# Patient Record
Sex: FEMALE | Race: WHITE | NOT HISPANIC OR LATINO | Employment: FULL TIME | URBAN - METROPOLITAN AREA
[De-identification: names, ages, dates, MRNs, and addresses within clinical notes are randomized per-mention and may not be internally consistent; named-entity substitution may affect disease eponyms.]

---

## 2018-11-16 ENCOUNTER — HOSPITAL ENCOUNTER (EMERGENCY)
Facility: HOSPITAL | Age: 33
Discharge: HOME | End: 2018-11-16
Attending: EMERGENCY MEDICINE | Admitting: EMERGENCY MEDICINE
Payer: OTHER MISCELLANEOUS

## 2018-11-16 VITALS
RESPIRATION RATE: 16 BRPM | SYSTOLIC BLOOD PRESSURE: 140 MMHG | HEART RATE: 74 BPM | DIASTOLIC BLOOD PRESSURE: 77 MMHG | TEMPERATURE: 96 F | WEIGHT: 170 LBS | OXYGEN SATURATION: 99 %

## 2018-11-16 DIAGNOSIS — IMO0001 NEEDLESTICK INJURY OF FINGER, INITIAL ENCOUNTER: Primary | ICD-10-CM

## 2018-11-16 LAB — ALT SERPL W P-5'-P-CCNC: 36 U/L (ref 9–52)

## 2018-11-16 PROCEDURE — 86706 HEP B SURFACE ANTIBODY: CPT | Performed by: EMERGENCY MEDICINE

## 2018-11-16 PROCEDURE — 99283 EMERGENCY DEPT VISIT LOW MDM: CPT

## 2018-11-16 PROCEDURE — 86803 HEPATITIS C AB TEST: CPT | Performed by: EMERGENCY MEDICINE

## 2018-11-16 PROCEDURE — 36415 COLL VENOUS BLD VENIPUNCTURE: CPT | Performed by: EMERGENCY MEDICINE

## 2018-11-16 PROCEDURE — 84460 ALANINE AMINO (ALT) (SGPT): CPT | Performed by: EMERGENCY MEDICINE

## 2018-11-16 PROCEDURE — 87389 HIV-1 AG W/HIV-1&-2 AB AG IA: CPT | Performed by: EMERGENCY MEDICINE

## 2018-11-16 PROCEDURE — 87340 HEPATITIS B SURFACE AG IA: CPT | Performed by: EMERGENCY MEDICINE

## 2018-11-16 NOTE — ED NOTES
Certificate of significance, pt demographics, and work related employee injury report paper work all faxed to [facility] ([phone]) as per protocol/policy     Doris Dumont RN  11/16/18 8863

## 2018-11-16 NOTE — DISCHARGE INSTRUCTIONS
Puncture Wound   WHAT YOU NEED TO KNOW:   A puncture wound is a hole in the skin made by a sharp, pointed object  DISCHARGE INSTRUCTIONS:   Return to the emergency department if:   · You have severe pain  · You have numbness or tingling in the area of your wound  · Your wound starts bleeding and does not stop, even after you apply pressure  Contact your healthcare provider if:   · You have new drainage or a bad odor coming from the wound  · You have a fever  · You have increased swelling, redness, or pain  · You have red streaks on your skin coming from your wound  · You have questions or concerns about your condition or care  Medicines: You may need any of the following:  · NSAIDs , such as ibuprofen, help decrease swelling, pain, and fever  This medicine is available with or without a doctor's order  NSAIDs can cause stomach bleeding or kidney problems in certain people  If you take blood thinner medicine, always ask your healthcare provider if NSAIDs are safe for you  Always read the medicine label and follow directions  · Antibiotics  help treat a bacterial infection  · Take your medicine as directed  Contact your healthcare provider if you think your medicine is not helping or if you have side effects  Tell him of her if you are allergic to any medicine  Keep a list of the medicines, vitamins, and herbs you take  Include the amounts, and when and why you take them  Bring the list or the pill bottles to follow-up visits  Carry your medicine list with you in case of an emergency  Wound care:  Keep your wound clean and dry  When you are allowed to bathe, carefully wash the wound with soap and water  Dry the area and put on new, clean bandages as directed  Change your bandages when they get wet or dirty  Manage your symptoms:   · Rest  your injured area as much as possible  If the puncture wound is in your leg or foot, use crutches as directed   This will help keep the weight off your injured leg or foot as it heals  · Elevate  your injured area above the level of your heart as often as you can  This will help decrease swelling and pain  Prop your injured area on pillows or blankets to keep it elevated comfortably  Follow up with your healthcare provider in 2 to 3 days:  Write down your questions so you remember to ask them during your visits  © 2017 [address] [employer] Information is for End User's use only and may not be sold, redistributed or otherwise used for commercial purposes  All illustrations and images included in CareNotes® are the copyrighted property of [employer]  or El[employer]  The above information is an  only  It is not intended as medical advice for individual conditions or treatments  Talk to your doctor, nurse or pharmacist before following any medical regimen to see if it is safe and effective for you

## 2018-11-16 NOTE — ED NOTES
Pt states that source pt had "exposure panel" blood work ordered from source pts attending dr Jaquelin Sarkar, RN  11/16/18 9314

## 2018-11-16 NOTE — ED PROVIDER NOTES
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 had needle stick to her right 1st finger  puncture site washed/cleaned [facility]     Needlestick exposure after drawing blood on patient  No visible blood on needle  Stuck through glove  Washed immediately  Source patient unknown status  Tetanus last updated 4/18 and patient received full series of HepB vaccine and HebB surface antibody positive  No other associated factors  Source patient MRN [id number]            None       History reviewed  No pertinent past medical history  History reviewed  No pertinent surgical history  History reviewed  No pertinent family history  I have reviewed and agree with the history as documented  Social History   Substance Use Topics    Smoking status: Current Some Day Smoker    Smokeless tobacco: Never Used    Alcohol use Yes      Comment: socially        Review of Systems   Constitutional: Negative for chills and fever  Musculoskeletal: Negative for arthralgias and joint swelling  Skin: Positive for wound  Negative for color change  Physical Exam  Physical Exam   Constitutional: She appears well-developed and well-nourished  HENT:   Head: Normocephalic and atraumatic  Cardiovascular: Normal rate and regular rhythm  Pulmonary/Chest: Effort normal and breath sounds normal    Skin:   Puncture wound base of right index finger  No redness or swelling         Vital Signs  ED Triage Vitals [11/16/18 1315]   Temperature Pulse Respirations Blood Pressure SpO2   (!) 96 °F (35 6 °C) 74 16 140/77 99 %      Temp Source Heart Rate Source Patient Position - Orthostatic VS BP Location FiO2 (%)   Tympanic -- Sitting Left arm --      Pain Score       --           Vitals:    11/16/18 1315   BP: 140/77   Pulse: 74   Patient Position - Orthostatic VS: Sitting       Visual Acuity      ED Medications  Medications - No data to display    Diagnostic Studies  Results Reviewed     Procedure Component Value Units Date/Time    HIV 1/2 AG-AB combo [[id number]]  (Normal) Collected:  11/16/18 1347    Lab Status:  Final result Specimen:  Blood from Arm, Left Updated:  11/18/18 0929     HIV-1/HIV-2 Ab Non-Reactive    Narrative: This test detects HIV 1, HIV2 and p24 Antigen  Hepatitis B surface antigen [[id number]]  (Normal) Collected:  11/16/18 1347    Lab Status:  Final result Specimen:  Blood from Arm, Left Updated:  11/17/18 1029     Hepatitis B Surface Ag Non-reactive    Hepatitis C antibody [[id number]]  (Normal) Collected:  11/16/18 1347    Lab Status:  Final result Specimen:  Blood from Arm, Left Updated:  11/17/18 1029     Hepatitis C Ab Non-reactive    Hepatitis B surface antibody [[id number]] Collected:  11/16/18 1347    Lab Status:  Final result Specimen:  Blood from Arm, Left Updated:  11/17/18 1029     Hep B S Ab >1,000 00 mIU/mL     ALT [[id number]]  (Normal) Collected:  11/16/18 1347    Lab Status:  Final result Specimen:  Blood from Arm, Left Updated:  11/16/18 1403     ALT 36 U/L                  No orders to display              Procedures  Procedures       Phone Contacts  ED Phone Contact    ED Course  ED Course as of Nov 20 0728 Fri Nov 16, 2018   1335 Confirmed with Dr Reynaldo Resendiz that source patient exposure panel    [phone] with lab at [facility]  Source patient blood tube is at [employer]  I called [facility] lab to confirm they have source patient bloodwork for add on and they do not  Called psychiatry floor to advise patient needs redraw for source patient labs  1430 I spoke with patient regarding PEP  PEP was offered as patient's status is unknown  She would like to defer until patient's HIV status is confirmed                                  MDM  CritCare Time    Disposition  Final diagnoses:   Needlestick injury of finger, initial encounter     Time reflects when diagnosis was documented in both MDM as applicable and the Disposition within this note     Time User Action Codes Description Comment    11/16/2018  2:44 PM January Ely Add [M90 483F,  W27  3XXA] Needlestick injury of finger, initial encounter       ED Disposition     ED Disposition Condition Comment    Discharge  Bari Landau discharge to home/self care  Condition at discharge: Stable        Follow-up Information     Follow up With Specialties Details Why [address] [facility]  In 3 days  [address] [place] [address]  [phone]          There are no discharge medications for this patient  No discharge procedures on file      ED Provider  Electronically Signed by           Allie Castellanos MD  11/20/18 7131

## 2018-11-17 LAB
HBV SURFACE AB SER-ACNC: >1000 MIU/ML
HBV SURFACE AG SER QL: NORMAL
HCV AB SER QL: NORMAL

## 2018-11-18 LAB — HIV 1+2 AB+HIV1 P24 AG SERPL QL IA: NORMAL

## 2021-09-17 ENCOUNTER — APPOINTMENT (OUTPATIENT)
Dept: URGENT CARE | Age: 36
End: 2021-09-17

## 2021-09-17 ENCOUNTER — APPOINTMENT (OUTPATIENT)
Dept: LAB | Age: 36
End: 2021-09-17

## 2021-09-17 DIAGNOSIS — Z00.8 OTHER SPECIFIED GENERAL MEDICAL EXAMINATION: ICD-10-CM

## 2021-09-17 LAB — RUBV IGG SERPL IA-ACNC: >175 IU/ML

## 2021-09-17 PROCEDURE — 86787 VARICELLA-ZOSTER ANTIBODY: CPT

## 2021-09-17 PROCEDURE — 86480 TB TEST CELL IMMUN MEASURE: CPT

## 2021-09-17 PROCEDURE — 86735 MUMPS ANTIBODY: CPT

## 2021-09-17 PROCEDURE — 36415 COLL VENOUS BLD VENIPUNCTURE: CPT

## 2021-09-17 PROCEDURE — 86765 RUBEOLA ANTIBODY: CPT

## 2021-09-17 PROCEDURE — 86762 RUBELLA ANTIBODY: CPT

## 2021-09-20 LAB
GAMMA INTERFERON BACKGROUND BLD IA-ACNC: 0.04 IU/ML
M TB IFN-G BLD-IMP: NEGATIVE
M TB IFN-G CD4+ BCKGRND COR BLD-ACNC: 0 IU/ML
M TB IFN-G CD4+ BCKGRND COR BLD-ACNC: 0 IU/ML
MEV IGG SER QL: NORMAL
MITOGEN IGNF BCKGRD COR BLD-ACNC: 8.18 IU/ML
MUV IGG SER QL: NORMAL

## 2021-09-21 LAB — VZV IGG SER IA-ACNC: NORMAL

## 2022-01-18 ENCOUNTER — HOSPITAL ENCOUNTER (OUTPATIENT)
Dept: MRI IMAGING | Facility: HOSPITAL | Age: 37
Discharge: HOME | End: 2022-01-18
Payer: COMMERCIAL

## 2022-01-18 DIAGNOSIS — M25.562 PAIN IN LEFT KNEE: ICD-10-CM

## 2022-01-18 PROCEDURE — G1004 CDSM NDSC: HCPCS

## 2022-01-18 PROCEDURE — 73721 MRI JNT OF LWR EXTRE W/O DYE: CPT

## 2022-01-31 ENCOUNTER — EVALUATION (OUTPATIENT)
Dept: PHYSICAL THERAPY | Facility: REHABILITATION | Age: 37
End: 2022-01-31
Payer: COMMERCIAL

## 2022-01-31 DIAGNOSIS — M25.562 ACUTE PAIN OF LEFT KNEE: Primary | ICD-10-CM

## 2022-01-31 DIAGNOSIS — S82.102D CLOSED FRACTURE OF PROXIMAL END OF LEFT TIBIA WITH ROUTINE HEALING, UNSPECIFIED FRACTURE MORPHOLOGY, SUBSEQUENT ENCOUNTER: ICD-10-CM

## 2022-01-31 PROCEDURE — 97110 THERAPEUTIC EXERCISES: CPT | Performed by: PHYSICAL THERAPIST

## 2022-01-31 PROCEDURE — 97161 PT EVAL LOW COMPLEX 20 MIN: CPT | Performed by: PHYSICAL THERAPIST

## 2022-01-31 NOTE — PROGRESS NOTES
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  She would benefit from physical therapy to improve strength and mobility and progress her to weight bearing activities when appropriate  Impairments: abnormal gait, abnormal or restricted ROM, activity intolerance, lacks appropriate home exercise program and pain with function    Symptom irritability: moderateUnderstanding of Dx/Px/POC: excellent  Goals  STG: in 4 weeks  Independent with HEP  Full ROM  Begin WB activities  LTG: by discharge  Pt RTW as a nurse  Returns to her prior exercise routine at the gym  Able to participate in activity with her son    Plan  Patient would benefit from: skilled physical therapy  Referral necessary: No  Planned modality interventions: cryotherapy  Planned therapy interventions: manual therapy, neuromuscular re-education, patient education, strengthening, stretching, therapeutic exercise and home exercise program  Frequency: 2x week  Duration in weeks: 8  Treatment plan discussed with: patient        Subjective Evaluation    History of Present Illness  Date of onset: 1/15/2022  Mechanism of injury: Hi a big chunk of ice while snow tubing and flipped out of the tube and sustained a small tibial fracture     Pt was advised to be NWB until her NDV on 2/10/22  Pain  Current pain ratin  At best pain ratin  At worst pain ratin  Location: lateral knee    Life stress: nurse, 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 independent but recently she has had to do more physical patient care  Patient Goals  Patient goals for therapy: decreased pain  Patient goal: exercise, work        Objective     Tenderness     Additional Tenderness Details  Tibial plateua    Active Range of Motion   Left Knee   Flexion: 98 degrees with pain  Extensor lag: 3 degrees with pain    Right Knee   Normal active range of motion    Passive Range of Motion   Left Knee   Flexion: 100 degrees   Extension: 0 degrees     Right Knee   Flexion: 150 degrees   Extension: 0 degrees     Strength/Myotome Testing     Left Knee   Quadriceps contraction: good    Right Knee   Normal strength    Swelling     Left Knee Girth Measurement (cm)   Joint line: 38 cm  10 cm below joint line: 44 cm    Right Knee Girth Measurement (cm)   Joint line: 38 cm  10 cm below joint line: 46 cm             Precautions: NWB     Daily Treatment Diary      Assessment  1/31                     Eval/Reval                       FOTO         **         **   Manuals                                                     Exercise Diary     QS 5"x10                      SLR w/QS 2x5                      Heel slides x10                      SL hip abd x10                      prone hip ext x10                      prone ham curls x10                                                                                                                                                                                                                                                                     Modalities                                                 Access Code: [email or web address]  URL: [email or web address]  Date: 01/31/2022  Prepared by: [place]    Exercises  Long Sitting Quad Set - 1 x daily - 7 x weekly - 3 sets - 10 reps - 5 sec hold  Active Straight Leg Raise with Quad Set - 1 x daily - 7 x weekly - 3 sets - 10 reps  Sidelying Hip Abduction - 1 x daily - 7 x weekly - 3 sets - 10 reps  Prone Hip Extension - 1 x daily - 7 x weekly - 3 sets - 10 reps  Prone Knee Flexion AROM - 1 x daily - 7 x weekly - 3 sets - 10 reps  Supine Knee Extension Strengthening - 1 x daily - 7 x weekly - 3 sets - 10 reps  Supine Heel Slide with Strap - 1 x daily - 7 x weekly - 3 sets - 10 reps - 5 sec hold

## 2022-02-02 ENCOUNTER — OFFICE VISIT (OUTPATIENT)
Dept: PHYSICAL THERAPY | Facility: REHABILITATION | Age: 37
End: 2022-02-02
Payer: COMMERCIAL

## 2022-02-02 DIAGNOSIS — S82.102D CLOSED FRACTURE OF PROXIMAL END OF LEFT TIBIA WITH ROUTINE HEALING, UNSPECIFIED FRACTURE MORPHOLOGY, SUBSEQUENT ENCOUNTER: ICD-10-CM

## 2022-02-02 DIAGNOSIS — M25.562 ACUTE PAIN OF LEFT KNEE: Primary | ICD-10-CM

## 2022-02-02 PROCEDURE — 97110 THERAPEUTIC EXERCISES: CPT | Performed by: PHYSICAL THERAPIST

## 2022-02-02 NOTE — PROGRESS NOTES
Daily Note     Today's date: 2022  Patient name: Arpita Mccollum  : 1985  MRN: [id number]  Referring provider: Merline Knack, DO  Dx:   Encounter Diagnosis     ICD-10-CM    1  Acute pain of left knee  M25 562    2  Closed fracture of proximal end of left tibia with routine healing, unspecified fracture morphology, subsequent encounter  S82 102D                   Subjective: Pt reports that she is a little more sore at the end of the day since beginning exercises  Objective: See treatment diary below  Adjusted pt's brace to fit her thigh better  Assessment: Tolerated treatment well  Knee flexion ROM is improving  Patient demonstrated fatigue post treatment      Plan: Continue per plan of care        Precautions: NWB     Daily Treatment Diary      Assessment    22                   Eval/Reval                       FOTO         **         **   Manuals    PROM   NT                   Prone quad stretch                          Exercise Diary     QS 5"x10  5"x15                    SLR w/QS 2x5  x10                    Heel slides x10  5"x10                    SL hip abd x10  2x10                    prone hip ext x10  2x10                    prone ham curls x10  x10                    SAQ   2x10                    prone knee flex stretch   20"x3                    hip adduction   10"x10                                                                                                                                                                                           Modalities

## 2022-02-07 ENCOUNTER — APPOINTMENT (OUTPATIENT)
Dept: PHYSICAL THERAPY | Facility: REHABILITATION | Age: 37
End: 2022-02-07
Payer: COMMERCIAL

## 2022-02-09 ENCOUNTER — OFFICE VISIT (OUTPATIENT)
Dept: PHYSICAL THERAPY | Facility: REHABILITATION | Age: 37
End: 2022-02-09
Payer: COMMERCIAL

## 2022-02-09 DIAGNOSIS — M25.562 ACUTE PAIN OF LEFT KNEE: Primary | ICD-10-CM

## 2022-02-09 DIAGNOSIS — S82.102D CLOSED FRACTURE OF PROXIMAL END OF LEFT TIBIA WITH ROUTINE HEALING, UNSPECIFIED FRACTURE MORPHOLOGY, SUBSEQUENT ENCOUNTER: ICD-10-CM

## 2022-02-09 PROCEDURE — 97140 MANUAL THERAPY 1/> REGIONS: CPT

## 2022-02-09 PROCEDURE — 97110 THERAPEUTIC EXERCISES: CPT

## 2022-02-09 NOTE — PROGRESS NOTES
Daily Note     Today's date: 2022  Patient name: Lacie Howard  : 1985  MRN: [id number]  Referring provider: Octavia Dejesus DO  Dx:   Encounter Diagnosis     ICD-10-CM    1  Acute pain of left knee  M25 562    2  Closed fracture of proximal end of left tibia with routine healing, unspecified fracture morphology, subsequent encounter  S82 336D                   Subjective:  Patient reports that her knee aches at night especially when it's cold  Deonte Gray notes that otherwise she is doing well  Patient reports compliance with brace use and maintaining her NWB status  Objective: See treatment diary below      Assessment: Patient tolerated treatment well  Patient performed ex and received manual therapy as noted  Patient demonstrates a good L quad activation and maintains good knee control during SLR  L knee AROM 0-135 degrees  Patient would benefit from continued PT intervention to address deficits and attain set goals  Plan: Continue per plan of care        Precautions: NWB     Daily Treatment Diary      Assessment                   Eval/Reval                       FOTO         **         **   Manuals    PROM   NT  CC                 Prone quad stretch                          Exercise Diary     QS 5"x10  5"x15  5"x20                  SLR w/QS 2x5  x10  x15                  Heel slides x10  5"x10  10"x10                  SL hip abd x10  2x10  2x10                  prone hip ext x10  2x10  2x10                  prone ham curls x10  x10  2x10                  SAQ   2x10  5" 2x10                  prone knee flex stretch   20"x3  20"x3                  hip adduction   10"x10  10"x10                                                                                                                                                                                         Modalities

## 2022-02-14 ENCOUNTER — OFFICE VISIT (OUTPATIENT)
Dept: PHYSICAL THERAPY | Facility: REHABILITATION | Age: 37
End: 2022-02-14
Payer: COMMERCIAL

## 2022-02-14 DIAGNOSIS — S82.102D CLOSED FRACTURE OF PROXIMAL END OF LEFT TIBIA WITH ROUTINE HEALING, UNSPECIFIED FRACTURE MORPHOLOGY, SUBSEQUENT ENCOUNTER: ICD-10-CM

## 2022-02-14 DIAGNOSIS — M25.562 ACUTE PAIN OF LEFT KNEE: Primary | ICD-10-CM

## 2022-02-14 PROCEDURE — 97112 NEUROMUSCULAR REEDUCATION: CPT

## 2022-02-14 PROCEDURE — 97110 THERAPEUTIC EXERCISES: CPT

## 2022-02-14 NOTE — PROGRESS NOTES
Daily Note     Today's date: 2022  Patient name: Qian Birmingham  : 1985  MRN: [id number]  Referring provider: Katalina Ribeiro DO  Dx:   Encounter Diagnosis     ICD-10-CM    1  Acute pain of left knee  M25 562    2  Closed fracture of proximal end of left tibia with routine healing, unspecified fracture morphology, subsequent encounter  S82 102D                   Subjective:  Patient reports that she saw the Dr and she was able to get rid of the crutches and open her brace  Sally Braydon notes feeling some muscle soreness with transitioning off the crutches  She may return to work in 2 weeks  Objective: See treatment diary below      Assessment: Patient tolerated treatment well  Patient performed ex as noted  Added stationary biking and progressed ex as noted  Patient performed ex with appropriate challenge and no reports of pain  Good L knee ROM noted  Patient is ambulating with the ROM brace unlocked (no crutches) with good gait mechanics noted  Patient would benefit from continued PT intervention to address deficits and attain set goals  Plan: Continue per plan of care        Precautions: NWB     Daily Treatment Diary      Assessment                 Eval/Reval                       FOTO         **         **   Manuals    PROM   NT  CC  np               Prone quad stretch                          Exercise Diary     QS 5"x10  5"x15  5"x20  5"x20                SLR w/QS 2x5  x10  x15  5" 2x10                Heel slides x10  5"x10  10"x10  HEP                SL hip abd x10  2x10  2x10  5" 2x10                prone hip ext x10  2x10  2x10  5" 2x10                prone ham curls x10  x10  2x10  standing  5" 2x10                SAQ   2x10  5" 2x10  5" 2x10                prone knee flex stretch   20"x3  20"x3  HEP                hip adduction   10"x10  10"x10  s/l 2x10 bike        x6'                                                               Modalities

## 2022-02-16 ENCOUNTER — OFFICE VISIT (OUTPATIENT)
Dept: PHYSICAL THERAPY | Facility: REHABILITATION | Age: 37
End: 2022-02-16
Payer: COMMERCIAL

## 2022-02-16 DIAGNOSIS — M25.562 ACUTE PAIN OF LEFT KNEE: Primary | ICD-10-CM

## 2022-02-16 DIAGNOSIS — S82.102D CLOSED FRACTURE OF PROXIMAL END OF LEFT TIBIA WITH ROUTINE HEALING, UNSPECIFIED FRACTURE MORPHOLOGY, SUBSEQUENT ENCOUNTER: ICD-10-CM

## 2022-02-16 PROCEDURE — 97112 NEUROMUSCULAR REEDUCATION: CPT | Performed by: PHYSICAL THERAPIST

## 2022-02-16 PROCEDURE — 97140 MANUAL THERAPY 1/> REGIONS: CPT | Performed by: PHYSICAL THERAPIST

## 2022-02-16 PROCEDURE — 97110 THERAPEUTIC EXERCISES: CPT | Performed by: PHYSICAL THERAPIST

## 2022-02-16 NOTE — PROGRESS NOTES
Daily Note     Today's date: 2022  Patient name: Yessica Gonsalves  : 1985  MRN: [id number]  Referring provider: Antonieta Xiao DO  Dx:   Encounter Diagnosis     ICD-10-CM    1  Acute pain of left knee  M25 562    2  Closed fracture of proximal end of left tibia with routine healing, unspecified fracture morphology, subsequent encounter  S82 102D                   Subjective:  Patient reports mild soreness from being on her feet but overall feeling good  Objective: See treatment diary below      Assessment: Patient tolerated treatment well  Pt demonstrates good quad control  Tolerated increases well  Can't yet sit with her leg curled underneath her  Plan: Continue per plan of care      RTW   Precautions: NWB     Daily Treatment Diary      Assessment               Eval/Reval                       FOTO         x         **   Manuals    PROM   NT  CC  np  prone knee flex stretch             Prone quad stretch                          Exercise Diary     QS 5"x10  5"x15  5"x20  5"x20  5"x20              SLR w/QS 2x5  x10  x15  5" 2x10  5"x20              Heel slides x10  5"x10  10"x10  HEP                SL hip abd x10  2x10  2x10  5" 2x10 1#2x10              prone hip ext x10  2x10  2x10  5" 2x10 1#2x10              prone ham curls x10  x10  2x10  standing  5" 2x10 1# 2x10              SAQ   2x10  5" 2x10  5" 2x10 1#2x10              prone knee flex stretch   20"x3  20"x3  HEP                hip adduction   10"x10  10"x10  s/l 2x10 1#2x10              bridges                                                                                                bike        x6'  10'              HR/TR         2x10ea              Rocker board                       Modalities

## 2022-02-21 ENCOUNTER — OFFICE VISIT (OUTPATIENT)
Dept: PHYSICAL THERAPY | Facility: REHABILITATION | Age: 37
End: 2022-02-21
Payer: COMMERCIAL

## 2022-02-21 DIAGNOSIS — M25.562 ACUTE PAIN OF LEFT KNEE: Primary | ICD-10-CM

## 2022-02-21 DIAGNOSIS — S82.102D CLOSED FRACTURE OF PROXIMAL END OF LEFT TIBIA WITH ROUTINE HEALING, UNSPECIFIED FRACTURE MORPHOLOGY, SUBSEQUENT ENCOUNTER: ICD-10-CM

## 2022-02-21 PROCEDURE — 97110 THERAPEUTIC EXERCISES: CPT

## 2022-02-21 PROCEDURE — 97112 NEUROMUSCULAR REEDUCATION: CPT

## 2022-02-21 NOTE — PROGRESS NOTES
Daily Note     Today's date: 2022  Patient name: Gene Ramos  : 1985  MRN: [id number]  Referring provider: Marva Bruce DO  Dx:   Encounter Diagnosis     ICD-10-CM    1  Acute pain of left knee  M25 562    2  Closed fracture of proximal end of left tibia with routine healing, unspecified fracture morphology, subsequent encounter  S82 223D                   Subjective:   Patient reports that her leg gets achey with weather changes and at the end of the day  She notes her leg feels weak at the end of the day also  Objective: See treatment diary below      Assessment: Patient tolerated treatment well  Patient performed ex and received manual therapy as noted  Patient denied any soreness or pain during the session  L knee ROM and strength continue to improve  Patient would benefit from continued PT intervention to address deficits and attain set goals  Plan: Continue per plan of care        RTW   Precautions: NWB     Daily Treatment Diary      Assessment             Eval/Reval                       FOTO         x         **   Manuals    PROM   NT  CC  np  prone knee flex stretch  CC           Prone quad stretch                          Exercise Diary     QS 5"x10  5"x15  5"x20  5"x20  5"x20  5"x20            SLR w/QS 2x5  x10  x15  5" 2x10  5"x20  1# 2x10            Heel slides x10  5"x10  10"x10  HEP                SL hip abd x10  2x10  2x10  5" 2x10 1#2x10  1# 2x10            prone hip ext x10  2x10  2x10  5" 2x10 1#2x10  1# 2x10            prone ham curls x10  x10  2x10  standing  5" 2x10 1# 2x10  1# 2x10            SAQ   2x10  5" 2x10  5" 2x10 1#2x10  1#   2x10            prone knee flex stretch   20"x3  20"x3  HEP                hip adduction   10"x10  10"x10  s/l 2x10 1#2x10  1#  2x10            bridges            5"x10                                                                                    bike        x6'  10'  10' HR/TR         2x10ea  2x10 ea            Rocker board                       Modalities

## 2022-02-23 ENCOUNTER — OFFICE VISIT (OUTPATIENT)
Dept: PHYSICAL THERAPY | Facility: REHABILITATION | Age: 37
End: 2022-02-23
Payer: COMMERCIAL

## 2022-02-23 DIAGNOSIS — S82.102D CLOSED FRACTURE OF PROXIMAL END OF LEFT TIBIA WITH ROUTINE HEALING, UNSPECIFIED FRACTURE MORPHOLOGY, SUBSEQUENT ENCOUNTER: ICD-10-CM

## 2022-02-23 DIAGNOSIS — M25.562 ACUTE PAIN OF LEFT KNEE: Primary | ICD-10-CM

## 2022-02-23 PROCEDURE — 97110 THERAPEUTIC EXERCISES: CPT

## 2022-02-23 PROCEDURE — 97112 NEUROMUSCULAR REEDUCATION: CPT

## 2022-02-23 NOTE — PROGRESS NOTES
Daily Note     Today's date: 2022  Patient name: Lily Mckenzie  : 1985  MRN: [id number]  Referring provider: Daniel Mcintyre DO  Dx:   Encounter Diagnosis     ICD-10-CM    1  Acute pain of left knee  M25 562    2  Closed fracture of proximal end of left tibia with routine healing, unspecified fracture morphology, subsequent encounter  S83 483D                   Subjective:  Patient reports that she was sore in her hamstrings after her last visit but it didn't last long  Objective: See treatment diary below      Assessment: Patient tolerated treatment well  Patient performed ex and received manual therapy as noted  Patient noted fatigue only post treatment  Patient would benefit from continued PT intervention to address deficits and attain set goals  Plan: Continue per plan of care        RTW   Precautions: NWB     Daily Treatment Diary      Assessment           Eval/Reval                       FOTO         x         **   Manuals    PROM   NT  CC  np  prone knee flex stretch  CC  CC         Prone quad stretch                          Exercise Diary     QS 5"x10  5"x15  5"x20  5"x20  5"x20  5"x20  5"x20          SLR w/QS 2x5  x10  x15  5" 2x10  5"x20  1# 2x10  1#  3x10          Heel slides x10  5"x10  10"x10  HEP                SL hip abd x10  2x10  2x10  5" 2x10 1#2x10  1# 2x10  1#  3x10          prone hip ext x10  2x10  2x10  5" 2x10 1#2x10  1# 2x10  1#  3x10          prone ham curls x10  x10  2x10  standing  5" 2x10 1# 2x10  1# 2x10  1#  3x10          SAQ   2x10  5" 2x10  5" 2x10 1#2x10  1#   2x10  1#  3x10          prone knee flex stretch   20"x3  20"x3  HEP                hip adduction   10"x10  10"x10  s/l 2x10 1#2x10  1#  2x10  1#  3x10          bridges            5"x10  5" 2x10                                                                                  bike        x6'  10'  10'  10'          HR/TR         2x10ea  2x10 ea  3x10 ea Rocker board              x20 ea         Modalities

## 2022-03-03 ENCOUNTER — OFFICE VISIT (OUTPATIENT)
Dept: PHYSICAL THERAPY | Facility: REHABILITATION | Age: 37
End: 2022-03-03
Payer: COMMERCIAL

## 2022-03-03 DIAGNOSIS — M25.562 ACUTE PAIN OF LEFT KNEE: Primary | ICD-10-CM

## 2022-03-03 DIAGNOSIS — S82.102D CLOSED FRACTURE OF PROXIMAL END OF LEFT TIBIA WITH ROUTINE HEALING, UNSPECIFIED FRACTURE MORPHOLOGY, SUBSEQUENT ENCOUNTER: ICD-10-CM

## 2022-03-03 PROCEDURE — 97112 NEUROMUSCULAR REEDUCATION: CPT | Performed by: PHYSICAL THERAPIST

## 2022-03-03 PROCEDURE — 97110 THERAPEUTIC EXERCISES: CPT | Performed by: PHYSICAL THERAPIST

## 2022-03-03 NOTE — PROGRESS NOTES
Daily Note     Today's date: 3/3/2022  Patient name: Bari Landau  : 1985  MRN: [id number]  Referring provider: Metro Kussmaul, DO  Dx:   Encounter Diagnosis     ICD-10-CM    1  Acute pain of left knee  M25 562    2  Closed fracture of proximal end of left tibia with routine healing, unspecified fracture morphology, subsequent encounter  S82 080D                   Subjective:  Patient reports that her return to work has gone well  She was on the floor and was tired afterward  Objective: See treatment diary below      Assessment: Patient tolerated treatment well  Challenged with leg press  Strength and function continue to improve  Patient would benefit from continued PT intervention to address deficits and attain set goals  Plan: Continue per plan of care        RTW   Precautions: NWB     Daily Treatment Diary      Assessment  1/31  2/2  2/9  2/14  2/16  2/21  2/23  3/3       Eval/Reval                       FOTO         x         **   Manuals    PROM   NT  CC  np  prone knee flex stretch  CC  CC  NT       Prone quad stretch                          Exercise Diary     QS 5"x10  5"x15  5"x20  5"x20  5"x20  5"x20  5"x20          SLR w/QS 2x5  x10  x15  5" 2x10  5"x20  1# 2x10  1#  3x10  2# 2x10        Heel slides x10  5"x10  10"x10  HEP                SL hip abd x10  2x10  2x10  5" 2x10 1#2x10  1# 2x10  1#  3x10  2# 2x10        prone hip ext x10  2x10  2x10  5" 2x10 1#2x10  1# 2x10  1#  3x10  2# 2x10        prone ham curls x10  x10  2x10  standing  5" 2x10 1# 2x10  1# 2x10  1#  3x10  2# 2x10        SAQ   2x10  5" 2x10  5" 2x10 1#2x10  1#   2x10  1#  3x10  2# 2x10        prone knee flex stretch   20"x3  20"x3  HEP                hip adduction   10"x10  10"x10  s/l 2x10 1#2x10  1#  2x10  1#  3x10  2# 2x10       bridges            5"x10  5" 2x10  Green ball x20        Leg press                44# 2x8                                                        bike        x6'  10'  10'  10'  10' HR/TR         2x10ea  2x10 ea  3x10 ea  D/C        Rocker board              x20 ea  x20 & bal         Modalities

## 2022-03-08 ENCOUNTER — OFFICE VISIT (OUTPATIENT)
Dept: PHYSICAL THERAPY | Facility: REHABILITATION | Age: 37
End: 2022-03-08
Payer: COMMERCIAL

## 2022-03-08 DIAGNOSIS — S82.102D CLOSED FRACTURE OF PROXIMAL END OF LEFT TIBIA WITH ROUTINE HEALING, UNSPECIFIED FRACTURE MORPHOLOGY, SUBSEQUENT ENCOUNTER: Primary | ICD-10-CM

## 2022-03-08 DIAGNOSIS — M25.562 ACUTE PAIN OF LEFT KNEE: ICD-10-CM

## 2022-03-08 PROCEDURE — 97112 NEUROMUSCULAR REEDUCATION: CPT

## 2022-03-08 PROCEDURE — 97110 THERAPEUTIC EXERCISES: CPT

## 2022-03-08 NOTE — PROGRESS NOTES
Daily Note     Today's date: 3/8/2022  Patient name: Lore Kang  : 1985  MRN: [id number]  Referring provider: Paolo Hood DO  Dx:   Encounter Diagnosis     ICD-10-CM    1  Closed fracture of proximal end of left tibia with routine healing, unspecified fracture morphology, subsequent encounter  S82 102D    2  Acute pain of left knee  M25 562                   Subjective:  Patient reports that she is feeling good  She notes that she felt weak with the exercises at her last visit but no soreness post        Objective: See treatment diary below      Assessment: Patient tolerated treatment well  Patient performed ex and received manual therapy as noted  Patient demonstrates improving L LE strength and L knee ROM  Mild knee flexion ROM limitation noted when compared to R knee  Patient would benefit from continued PT intervention to address deficits and attain set goals  Plan: Continue per plan of care        RTW   Precautions: NWB     Daily Treatment Diary      Assessment    2/2  2/9  2/14  2/16  2/21  2/23  3/3  3/8     Eval/Reval                       FOTO         x         **   Manuals    PROM   NT  CC  np  prone knee flex stretch  CC  CC  NT  CC     Prone quad stretch                          Exercise Diary     QS 5"x10  5"x15  5"x20  5"x20  5"x20  5"x20  5"x20          SLR w/QS 2x5  x10  x15  5" 2x10  5"x20  1# 2x10  1#  3x10  2# 2x10  2#  3x10      Heel slides x10  5"x10  10"x10  HEP                SL hip abd x10  2x10  2x10  5" 2x10 1#2x10  1# 2x10  1#  3x10  2# 2x10  2#  3x10      prone hip ext x10  2x10  2x10  5" 2x10 1#2x10  1# 2x10  1#  3x10  2# 2x10  2#  3x10      prone ham curls x10  x10  2x10  standing  5" 2x10 1# 2x10  1# 2x10  1#  3x10  2# 2x10  2#  3x10      SAQ   2x10  5" 2x10  5" 2x10 1#2x10  1#   2x10  1#  3x10  2# 2x10  2#  3x10      prone knee flex stretch   20"x3  20"x3  HEP                hip adduction   10"x10  10"x10  s/l 2x10 1#2x10  1#  2x10  1#  3x10  2# 2x10 2#  3x10      bridges            5"x10  5" 2x10  Green ball x20  green ball  x30      Leg press                44# 2x8  44# SL  2x10     SA ham                  33# 2x10      squat                  nv      bike        x6'  10'  10'  10'  10'  x10'      HR/TR         2x10ea  2x10 ea  3x10 ea  D/C        Rocker board              x20 ea  x20 & bal    x30 ea  1' bal ea     Modalities

## 2022-03-15 ENCOUNTER — APPOINTMENT (OUTPATIENT)
Dept: PHYSICAL THERAPY | Facility: REHABILITATION | Age: 37
End: 2022-03-15
Payer: COMMERCIAL

## 2022-03-18 ENCOUNTER — OFFICE VISIT (OUTPATIENT)
Dept: PHYSICAL THERAPY | Facility: REHABILITATION | Age: 37
End: 2022-03-18
Payer: COMMERCIAL

## 2022-03-18 DIAGNOSIS — M25.562 ACUTE PAIN OF LEFT KNEE: ICD-10-CM

## 2022-03-18 DIAGNOSIS — S82.102D CLOSED FRACTURE OF PROXIMAL END OF LEFT TIBIA WITH ROUTINE HEALING, UNSPECIFIED FRACTURE MORPHOLOGY, SUBSEQUENT ENCOUNTER: Primary | ICD-10-CM

## 2022-03-18 PROCEDURE — 97112 NEUROMUSCULAR REEDUCATION: CPT | Performed by: PHYSICAL THERAPIST

## 2022-03-18 PROCEDURE — 97110 THERAPEUTIC EXERCISES: CPT | Performed by: PHYSICAL THERAPIST

## 2022-03-18 NOTE — PROGRESS NOTES
Daily Note     Today's date: 3/18/2022  Patient name: Babatunde Tirado  : 1985  MRN: [id number]  Referring provider: Yue Gomez DO  Dx:   Encounter Diagnosis     ICD-10-CM    1  Closed fracture of proximal end of left tibia with routine healing, unspecified fracture morphology, subsequent encounter  S82 102D    2  Acute pain of left knee  M25 562                   Subjective:  Patient with no new complaints since last session  She reports she has returned to the gym and is overall doing well  She will be finishing up with PT next week as she has returned to work full-duty and is expecting to be cleared by MD next week  Objective: See treatment diary below  Assessment: Pt demonstrated good overall knowledge, tolerance and performance with current program  Will continue to progress program as able  Pt will benefit from continued skilled PT intervention in order to address her remaining limitations and to restore maximal function  Plan: Continue per plan of care        RTW   Precautions: NWB     Daily Treatment Diary      Assessment  1/31  2/2  2/9  2/14  2/16  2/21  2/23  3/3  3/8  3/18   Eval/Reval                       FOTO         x         **   Manuals    PROM   NT  CC  np  prone knee flex stretch  CC  CC  NT  CC  NP - Full ROM   Prone quad stretch                          Exercise Diary     QS 5"x10  5"x15  5"x20  5"x20  5"x20  5"x20  5"x20          SLR w/QS 2x5  x10  x15  5" 2x10  5"x20  1# 2x10  1#  3x10  2# 2x10  2#  3x10   2#  3x10    Heel slides x10  5"x10  10"x10  HEP                SL hip abd x10  2x10  2x10  5" 2x10 1#2x10  1# 2x10  1#  3x10  2# 2x10  2#  3x10   2#  3x10    prone hip ext x10  2x10  2x10  5" 2x10 1#2x10  1# 2x10  1#  3x10  2# 2x10  2#  3x10   2#  3x10    prone ham curls x10  x10  2x10  standing  5" 2x10 1# 2x10  1# 2x10  1#  3x10  2# 2x10  2#  3x10   2#  3x10    SAQ   2x10  5" 2x10  5" 2x10 1#2x10  1#   2x10  1#  3x10  2# 2x10  2#  3x10   2#  3x10    prone knee flex stretch   20"x3  20"x3  HEP                hip adduction   10"x10  10"x10  s/l 2x10 1#2x10  1#  2x10  1#  3x10  2# 2x10 2#  3x10   2#  3x10    bridges            5"x10  5" 2x10  Green ball x20  green ball  x30  Green Ball x30    Leg press                44# 2x8  44# SL  2x10  44# SL  2x10   SA ham                  33# 2x10  33# 2x10    squat                  nv      bike        x6'  10'  10'  10'  10'  x10'      HR/TR         2x10ea  2x10 ea  3x10 ea  D/C        Rocker board              x20 ea  x20 & bal    x30 ea  1' bal ea  x30 ea  1' bal ea   Modalities

## 2022-03-21 NOTE — PROGRESS NOTES
Spoke to Arnold and she f/u with her doctor next week  She will only return if her doctor feels she should not release her yet

## 2022-08-02 ENCOUNTER — HOSPITAL ENCOUNTER (EMERGENCY)
Facility: HOSPITAL | Age: 37
Discharge: HOME | End: 2022-08-02
Attending: EMERGENCY MEDICINE
Payer: COMMERCIAL

## 2022-08-02 VITALS
OXYGEN SATURATION: 99 % | RESPIRATION RATE: 18 BRPM | SYSTOLIC BLOOD PRESSURE: 135 MMHG | TEMPERATURE: 98 F | HEART RATE: 78 BPM | DIASTOLIC BLOOD PRESSURE: 79 MMHG

## 2022-08-02 DIAGNOSIS — Z98.890 STATUS POST ELECTIVE ABORTION: ICD-10-CM

## 2022-08-02 DIAGNOSIS — R10.32 BILATERAL LOWER ABDOMINAL CRAMPING: Primary | ICD-10-CM

## 2022-08-02 DIAGNOSIS — R10.31 BILATERAL LOWER ABDOMINAL CRAMPING: Primary | ICD-10-CM

## 2022-08-02 LAB
BACTERIA UR QL AUTO: NORMAL /HPF
BILIRUB UR QL STRIP: NEGATIVE
CLARITY UR: CLEAR
COLOR UR: ABNORMAL
EXT PREG TEST URINE: NEGATIVE
EXT. CONTROL ED NAV: NORMAL
GLUCOSE UR STRIP-MCNC: NEGATIVE MG/DL
HGB UR QL STRIP.AUTO: 250
KETONES UR STRIP-MCNC: NEGATIVE MG/DL
LEUKOCYTE ESTERASE UR QL STRIP: 25
NITRITE UR QL STRIP: NEGATIVE
NON-SQ EPI CELLS URNS QL MICRO: NORMAL /HPF
PH UR STRIP.AUTO: 7 [PH]
PROT UR STRIP-MCNC: NEGATIVE MG/DL
RBC #/AREA URNS AUTO: NORMAL /HPF
SP GR UR STRIP.AUTO: 1.01 (ref 1–1.04)
UROBILINOGEN UA: NEGATIVE MG/DL
WBC #/AREA URNS AUTO: NORMAL /HPF

## 2022-08-02 PROCEDURE — 81001 URINALYSIS AUTO W/SCOPE: CPT | Performed by: EMERGENCY MEDICINE

## 2022-08-02 PROCEDURE — 99284 EMERGENCY DEPT VISIT MOD MDM: CPT

## 2022-08-02 PROCEDURE — 99282 EMERGENCY DEPT VISIT SF MDM: CPT | Performed by: EMERGENCY MEDICINE

## 2022-08-02 PROCEDURE — 81003 URINALYSIS AUTO W/O SCOPE: CPT | Performed by: EMERGENCY MEDICINE

## 2022-08-02 PROCEDURE — 81025 URINE PREGNANCY TEST: CPT | Performed by: EMERGENCY MEDICINE

## 2022-08-02 RX ORDER — CETIRIZINE HYDROCHLORIDE 5 MG/1
5 TABLET ORAL DAILY
COMMUNITY

## 2022-08-02 NOTE — Clinical Note
Rubi Lopez was seen and treated in our emergency department on 8/2/2022  Diagnosis:     Lupis Chaudhry  may return to work on return date  She may return on this date: 08/02/2022         If you have any questions or concerns, please don't hesitate to call        Max Smith MD    ______________________________           _______________          _______________  Hospital Representative                              Date                                Time

## 2022-08-02 NOTE — ED PROVIDER NOTES
History  Chief Complaint   Patient presents with    Abdominal Pain     Lower abd pressure that started yesterday  Recent  22     79-year-old female  with recent elective  on 2022 presenting for evaluation of lower abdominal pain that started yesterday and acutely worsened throughout today  Patient states after the procedure, which was a D&C at 5 5 weeks gestation, patient experienced minimal bleeding and abdominal cramping  Her symptoms improved and she felt good until yesterday  Then she developed a constant aching pressure sensation in her pelvis with radiation to her back  She has an increase in vaginal bleeding but no clots  Denies purulent discharge  Denies sexual activity  Denies fever, chills, chest pain, shortness of breath, nausea, vomiting, back pain  Patient took ibuprofen today without significant improvement in her symptoms  Patient notes pain is worse with attempted urination  Abdominal Pain  Associated symptoms: vaginal bleeding    Associated symptoms: no chest pain, no chills, no cough, no diarrhea, no dysuria, no fever, no hematuria, no nausea, no shortness of breath, no sore throat, no vaginal discharge and no vomiting        Prior to Admission Medications   Prescriptions Last Dose Informant Patient Reported? Taking? cetirizine (ZyrTEC) 5 MG tablet   Yes Yes   Sig: Take 5 mg by mouth daily   sertraline (ZOLOFT) 50 mg tablet   Yes Yes   Sig: Take 50 mg by mouth daily      Facility-Administered Medications: None       History reviewed  No pertinent past medical history  History reviewed  No pertinent surgical history  History reviewed  No pertinent family history  I have reviewed and agree with the history as documented      E-Cigarette/Vaping     E-Cigarette/Vaping Substances     Social History     Tobacco Use    Smoking status: Current Some Day Smoker    Smokeless tobacco: Never Used   Substance Use Topics    Alcohol use: Yes     Comment: socially    Drug use: No       Review of Systems   Constitutional: Negative for chills and fever  HENT: Negative for congestion, rhinorrhea and sore throat  Eyes: Negative for pain, discharge and visual disturbance  Respiratory: Negative for cough and shortness of breath  Cardiovascular: Negative for chest pain, palpitations and leg swelling  Gastrointestinal: Positive for abdominal pain  Negative for diarrhea, nausea and vomiting  Genitourinary: Positive for pelvic pain and vaginal bleeding  Negative for dysuria, frequency, hematuria, vaginal discharge and vaginal pain  Musculoskeletal: Negative for arthralgias and myalgias  Skin: Negative for color change and rash  Neurological: Negative for dizziness, weakness, light-headedness, numbness and headaches  Hematological: Does not bruise/bleed easily  Physical Exam  Physical Exam  Vitals and nursing note reviewed  Exam conducted with a chaperone present  Constitutional:       General: She is not in acute distress  Appearance: She is not ill-appearing, toxic-appearing or diaphoretic  HENT:      Head: Normocephalic and atraumatic  Eyes:      General: No scleral icterus  Extraocular Movements: Extraocular movements intact  Cardiovascular:      Rate and Rhythm: Normal rate and regular rhythm  Pulmonary:      Effort: Pulmonary effort is normal  No respiratory distress  Breath sounds: No wheezing, rhonchi or rales  Abdominal:      General: Abdomen is flat  There is no distension  Palpations: Abdomen is soft  Tenderness: There is abdominal tenderness in the right lower quadrant, suprapubic area and left lower quadrant  There is no right CVA tenderness, left CVA tenderness, guarding or rebound  Genitourinary:     Vagina: Normal       Cervix: Discharge (blood, no purulent discharge) present        Comments: Sterile speculum exam only; minimal bleeding from os, no clots, no purulent discharge, no significant discomfort  Skin:     General: Skin is warm and dry  Capillary Refill: Capillary refill takes less than 2 seconds  Findings: No rash  Neurological:      General: No focal deficit present  Mental Status: She is alert and oriented to person, place, and time     Psychiatric:         Mood and Affect: Mood normal          Behavior: Behavior normal          Vital Signs  ED Triage Vitals [08/02/22 1654]   Temperature Pulse Respirations Blood Pressure SpO2   98 °F (36 7 °C) 78 18 135/79 99 %      Temp Source Heart Rate Source Patient Position - Orthostatic VS BP Location FiO2 (%)   Oral Monitor Sitting Right arm --      Pain Score       --           Vitals:    08/02/22 1654   BP: 135/79   Pulse: 78   Patient Position - Orthostatic VS: Sitting         Visual Acuity      ED Medications  Medications - No data to display    Diagnostic Studies  Results Reviewed     Procedure Component Value Units Date/Time    Urine Microscopic [[id number]]  (Normal) Collected: 08/02/22 1717    Lab Status: Final result Specimen: Urine, Clean Catch Updated: 08/02/22 1848     RBC, UA 0-1 /hpf      WBC, UA 1-2 /hpf      Epithelial Cells Occasional /hpf      Bacteria, UA Occasional /hpf     UA w Reflex to Microscopic w Reflex to Culture [[id number]]  (Abnormal) Collected: 08/02/22 1717    Lab Status: Final result Specimen: Urine, Clean Catch Updated: 08/02/22 1800     Color, UA Straw     Clarity, UA Clear     Specific Gravity, UA 1 010     pH, UA 7 0     Leukocytes, UA 25 0     Nitrite, UA Negative     Protein, UA Negative mg/dl      Glucose, UA Negative mg/dl      Ketones, UA Negative mg/dl      Bilirubin, UA Negative     Occult Blood,  0     UROBILINOGEN UA Negative mg/dL     POCT pregnancy, urine [[id number]]  (Normal) Resulted: 08/02/22 1715    Lab Status: Final result Updated: 08/02/22 1715     EXT PREG TEST UR (Ref: Negative) negative     Control valid                 No orders to display              Procedures  Procedures ED Course  ED Course as of 22   e Aug 02, 2022   1719 PREGNANCY TEST URINE: negative   1801 Leukocytes, UA(!): 25 0   1801 Blood, UA(!): 250 0   1801 Nitrite, UA: Negative                               SBIRT 22yo+    Flowsheet Row Most Recent Value   SBIRT (25 yo +)    In order to provide better care to our patients, we are screening all of our patients for alcohol and drug use  Would it be okay to ask you these screening questions? No Filed at: 2022 1655                    MDM  Number of Diagnoses or Management Options  Bilateral lower abdominal cramping  Status post elective   Diagnosis management comments: 30-year-old female presenting for evaluation of lower abdominal pressure sensation after elective  several days ago  Patient is well-appearing with reassuring vital signs  Low suspicion for infection clinically  I suspect postprocedural cramping, which is to be expected  Differential also includes urinary tract infection  Discussed with OBGYN, who recommends ruling out urinary tract infection and a pelvic examination to ensure no purulent discharge from the cervix  Urinalysis was obtained which is notable for blood in addition to small amount of leukocytes  Urine microscopy notable for occasional bacteria and epithelial cells but no WBCs; will send for culture  Not consistent with UTI currently  Pelvic examination performed and is negative for purulent discharge from the cervix  No significant discomfort during the pelvic examination  Low clinical suspicion for infection at this time  Discussed with OBGYN and ultrasound not felt to be necessary at this time  Suspect her pain is secondary to postprocedural cramping  Patient is appropriate for discharge home with outpatient follow-up  Counseled on supportive measures at home including acetaminophen and ibuprofen  Return precautions thoroughly discussed    Patient is in agreement and understanding of these instructions  Disposition  Final diagnoses:   Bilateral lower abdominal cramping   Status post elective      Time reflects when diagnosis was documented in both MDM as applicable and the Disposition within this note     Time User Action Codes Description Comment    2022  6:02 PM Bam Nunesl Add [R10 31,  R10 32] Bilateral lower abdominal cramping     2022  6:02 PM Delmis Caraballo Add [X72 869] Status post elective        ED Disposition     ED Disposition   Discharge    Condition   Stable    Date/Time   Tue Aug 2, 2022  6:02 PM    Comment   Burgess Jeffery discharge to home/self care  Follow-up Information     Follow up With Specialties Details Why Contact [facility] Schedule an appointment as soon as possible for a visit   [address]  [address] [facility] [address]  [phone]      Please follow up with your OB-GYN              Discharge Medication List as of 2022  6:03 PM      CONTINUE these medications which have NOT CHANGED    Details   cetirizine (ZyrTEC) 5 MG tablet Take 5 mg by mouth daily, Historical Med      sertraline (ZOLOFT) 50 mg tablet Take 50 mg by mouth daily, Historical Med             No discharge procedures on file      PDMP Review     None          ED Provider  Electronically Signed by           Erica Prado MD  [phone]

## 2022-08-03 ENCOUNTER — APPOINTMENT (OUTPATIENT)
Dept: LAB | Facility: HOSPITAL | Age: 37
End: 2022-08-03
Payer: COMMERCIAL

## 2022-08-03 DIAGNOSIS — Z00.8 HEALTH EXAMINATION IN POPULATION SURVEY: ICD-10-CM

## 2022-08-03 LAB
CHOLEST SERPL-MCNC: 173 MG/DL
HDLC SERPL-MCNC: 37 MG/DL
LDLC SERPL CALC-MCNC: 100 MG/DL
NONHDLC SERPL-MCNC: 136 MG/DL
TRIGL SERPL-MCNC: 179 MG/DL

## 2022-08-03 PROCEDURE — 80061 LIPID PANEL: CPT

## 2022-08-03 PROCEDURE — 36415 COLL VENOUS BLD VENIPUNCTURE: CPT

## 2022-08-03 PROCEDURE — 83036 HEMOGLOBIN GLYCOSYLATED A1C: CPT

## 2022-08-04 LAB
EST. AVERAGE GLUCOSE BLD GHB EST-MCNC: 105 MG/DL
HBA1C MFR BLD: 5.3 %

## 2022-09-14 ENCOUNTER — APPOINTMENT (EMERGENCY)
Dept: CT IMAGING | Facility: HOSPITAL | Age: 37
End: 2022-09-14
Payer: COMMERCIAL

## 2022-09-14 ENCOUNTER — HOSPITAL ENCOUNTER (EMERGENCY)
Facility: HOSPITAL | Age: 37
Discharge: HOME | End: 2022-09-14
Attending: EMERGENCY MEDICINE
Payer: COMMERCIAL

## 2022-09-14 ENCOUNTER — APPOINTMENT (OUTPATIENT)
Dept: RADIOLOGY | Facility: HOSPITAL | Age: 37
End: 2022-09-14
Payer: COMMERCIAL

## 2022-09-14 VITALS
WEIGHT: 176.15 LBS | TEMPERATURE: 97.9 F | HEART RATE: 62 BPM | DIASTOLIC BLOOD PRESSURE: 86 MMHG | OXYGEN SATURATION: 98 % | SYSTOLIC BLOOD PRESSURE: 133 MMHG | RESPIRATION RATE: 19 BRPM

## 2022-09-14 DIAGNOSIS — J98.11 ATELECTASIS: ICD-10-CM

## 2022-09-14 DIAGNOSIS — R07.9 CHEST PAIN, UNSPECIFIED TYPE: Primary | ICD-10-CM

## 2022-09-14 LAB
ANION GAP SERPL CALCULATED.3IONS-SCNC: 5 MMOL/L (ref 5–14)
ATRIAL RATE: 62 BPM
BASOPHILS # BLD AUTO: 0.02 THOUSANDS/ΜL (ref 0–0.1)
BASOPHILS NFR BLD AUTO: 0 % (ref 0–1)
BUN SERPL-MCNC: 15 MG/DL (ref 5–25)
CALCIUM SERPL-MCNC: 8.8 MG/DL (ref 8.4–10.2)
CARDIAC TROPONIN I PNL SERPL HS: <2 NG/L
CHLORIDE SERPL-SCNC: 103 MMOL/L (ref 96–108)
CO2 SERPL-SCNC: 27 MMOL/L (ref 21–32)
CREAT SERPL-MCNC: 0.62 MG/DL (ref 0.6–1.2)
D DIMER PPP FEU-MCNC: 0.7 UG/ML FEU
EOSINOPHIL # BLD AUTO: 0.12 THOUSAND/ΜL (ref 0–0.61)
EOSINOPHIL NFR BLD AUTO: 1 % (ref 0–6)
ERYTHROCYTE [DISTWIDTH] IN BLOOD BY AUTOMATED COUNT: 12 % (ref 11.6–15.1)
EXT PREG TEST URINE: NEGATIVE
EXT. CONTROL ED NAV: NORMAL
GFR SERPL CREATININE-BSD FRML MDRD: 118 ML/MIN/1.73SQ M
GLUCOSE SERPL-MCNC: 99 MG/DL (ref 70–99)
HCT VFR BLD AUTO: 37.8 % (ref 34.8–46.1)
HGB BLD-MCNC: 12.7 G/DL (ref 11.5–15.4)
IMM GRANULOCYTES # BLD AUTO: 0.05 THOUSAND/UL (ref 0–0.2)
IMM GRANULOCYTES NFR BLD AUTO: 1 % (ref 0–2)
LYMPHOCYTES # BLD AUTO: 1.75 THOUSANDS/ΜL (ref 0.6–4.47)
LYMPHOCYTES NFR BLD AUTO: 16 % (ref 14–44)
MCH RBC QN AUTO: 32.9 PG (ref 26.8–34.3)
MCHC RBC AUTO-ENTMCNC: 33.6 G/DL (ref 31.4–37.4)
MCV RBC AUTO: 98 FL (ref 82–98)
MONOCYTES # BLD AUTO: 0.57 THOUSAND/ΜL (ref 0.17–1.22)
MONOCYTES NFR BLD AUTO: 5 % (ref 4–12)
NEUTROPHILS # BLD AUTO: 8.28 THOUSANDS/ΜL (ref 1.85–7.62)
NEUTS SEG NFR BLD AUTO: 77 % (ref 43–75)
NRBC BLD AUTO-RTO: 0 /100 WBCS
P AXIS: 63 DEGREES
PLATELET # BLD AUTO: 207 THOUSANDS/UL (ref 149–390)
PMV BLD AUTO: 9.7 FL (ref 8.9–12.7)
POTASSIUM SERPL-SCNC: 4.2 MMOL/L (ref 3.5–5.3)
PR INTERVAL: 146 MS
QRS AXIS: 50 DEGREES
QRSD INTERVAL: 80 MS
QT INTERVAL: 390 MS
QTC INTERVAL: 395 MS
RBC # BLD AUTO: 3.86 MILLION/UL (ref 3.81–5.12)
SODIUM SERPL-SCNC: 135 MMOL/L (ref 135–147)
T WAVE AXIS: 26 DEGREES
TSH SERPL DL<=0.05 MIU/L-ACNC: 0.88 UIU/ML (ref 0.45–4.5)
VENTRICULAR RATE: 62 BPM
WBC # BLD AUTO: 10.79 THOUSAND/UL (ref 4.31–10.16)

## 2022-09-14 PROCEDURE — 84443 ASSAY THYROID STIM HORMONE: CPT | Performed by: EMERGENCY MEDICINE

## 2022-09-14 PROCEDURE — 80048 BASIC METABOLIC PNL TOTAL CA: CPT | Performed by: EMERGENCY MEDICINE

## 2022-09-14 PROCEDURE — 93010 ELECTROCARDIOGRAM REPORT: CPT | Performed by: INTERNAL MEDICINE

## 2022-09-14 PROCEDURE — 81025 URINE PREGNANCY TEST: CPT | Performed by: EMERGENCY MEDICINE

## 2022-09-14 PROCEDURE — 99285 EMERGENCY DEPT VISIT HI MDM: CPT | Performed by: EMERGENCY MEDICINE

## 2022-09-14 PROCEDURE — 85025 COMPLETE CBC W/AUTO DIFF WBC: CPT | Performed by: EMERGENCY MEDICINE

## 2022-09-14 PROCEDURE — 71046 X-RAY EXAM CHEST 2 VIEWS: CPT

## 2022-09-14 PROCEDURE — 85379 FIBRIN DEGRADATION QUANT: CPT | Performed by: EMERGENCY MEDICINE

## 2022-09-14 PROCEDURE — 84484 ASSAY OF TROPONIN QUANT: CPT | Performed by: EMERGENCY MEDICINE

## 2022-09-14 PROCEDURE — G1004 CDSM NDSC: HCPCS

## 2022-09-14 PROCEDURE — 36415 COLL VENOUS BLD VENIPUNCTURE: CPT | Performed by: EMERGENCY MEDICINE

## 2022-09-14 PROCEDURE — 71275 CT ANGIOGRAPHY CHEST: CPT

## 2022-09-14 PROCEDURE — 99285 EMERGENCY DEPT VISIT HI MDM: CPT

## 2022-09-14 PROCEDURE — 93005 ELECTROCARDIOGRAM TRACING: CPT

## 2022-09-14 RX ADMIN — IOHEXOL 85 ML: 350 INJECTION, SOLUTION INTRAVENOUS at 12:55

## 2022-09-14 NOTE — Clinical Note
Deborah Loud was seen and treated in our emergency department on 9/14/2022  Diagnosis:     Daniel Schuster  may return to work on return date  She may return on this date: 09/14/2022         If you have any questions or concerns, please don't hesitate to call        Christy Lundberg MD    ______________________________           _______________          _______________  Hospital Representative                              Date                                Time

## 2022-09-14 NOTE — ED PROVIDER NOTES
History  Chief Complaint   Patient presents with    Shortness of Breath     Pt reports shortness of breath that has been going on for a few weeks, but worsened this morning  Pt also reports rib pain that worsens with deep breathing   Rib Pain     22-year-old female with history of recent elective  presenting for evaluation of chest pain  Patient states this has been occurring more frequently over the last several weeks  She notes intermittent sharp left-sided chest pain with radiation to her shoulder and left arm  Her symptoms are worse with movement and exertion  Patient states she is unable to have conversations secondary to feeling short of breath  Pain currently is sharp and underneath her left breast   Denies alleviating factors  Took Motrin an hour prior to arrival   Denies associated fever, chills, upper respiratory symptoms, hemoptysis, history of DVT or PE  Endorses occasional, social tobacco use, and patient was previously pregnant with elective  almost 2 months ago  No period of immobility or travel  No exogenous estrogen use  Denies nausea, vomiting, abdominal pain, leg swelling  Prior to Admission Medications   Prescriptions Last Dose Informant Patient Reported? Taking? cetirizine (ZyrTEC) 5 MG tablet   Yes No   Sig: Take 5 mg by mouth daily   sertraline (ZOLOFT) 50 mg tablet   Yes No   Sig: Take 50 mg by mouth daily      Facility-Administered Medications: None       History reviewed  No pertinent past medical history  Past Surgical History:   Procedure Laterality Date    DILATION AND CURETTAGE OF UTERUS         History reviewed  No pertinent family history  I have reviewed and agree with the history as documented      E-Cigarette/Vaping     E-Cigarette/Vaping Substances     Social History     Tobacco Use    Smoking status: Current Some Day Smoker    Smokeless tobacco: Never Used   Substance Use Topics    Alcohol use: Yes     Comment: socially    Drug use: No       Review of Systems   Constitutional: Negative for chills and fever  HENT: Negative for congestion, rhinorrhea and sore throat  Eyes: Negative for pain, discharge and visual disturbance  Respiratory: Positive for shortness of breath  Negative for cough  Cardiovascular: Positive for chest pain  Negative for palpitations and leg swelling  Gastrointestinal: Negative for abdominal pain, diarrhea, nausea and vomiting  Genitourinary: Negative for dysuria, frequency and hematuria  Musculoskeletal: Negative for arthralgias and myalgias  Skin: Negative for color change and rash  Neurological: Negative for dizziness, weakness, light-headedness, numbness and headaches  Hematological: Does not bruise/bleed easily  Physical Exam  Physical Exam  Vitals and nursing note reviewed  Constitutional:       General: She is not in acute distress  Appearance: She is not ill-appearing, toxic-appearing or diaphoretic  HENT:      Head: Normocephalic and atraumatic  Mouth/Throat:      Mouth: Mucous membranes are moist    Eyes:      Pupils: Pupils are equal, round, and reactive to light  Cardiovascular:      Rate and Rhythm: Normal rate and regular rhythm  Heart sounds: No murmur heard  Pulmonary:      Effort: Pulmonary effort is normal  No tachypnea  Breath sounds: Normal breath sounds  No decreased breath sounds, wheezing, rhonchi or rales  Chest:      Chest wall: No tenderness or crepitus  Abdominal:      Palpations: Abdomen is soft  Musculoskeletal:      Cervical back: Neck supple  Right lower leg: No edema  Left lower leg: No edema  Skin:     General: Skin is warm and dry  Capillary Refill: Capillary refill takes less than 2 seconds  Findings: No rash  Neurological:      General: No focal deficit present  Mental Status: She is alert and oriented to person, place, and time     Psychiatric:         Mood and Affect: Mood normal  Behavior: Behavior normal          Vital Signs  ED Triage Vitals   Temperature Pulse Respirations Blood Pressure SpO2   09/14/22 1300 09/14/22 1117 09/14/22 1117 09/14/22 1117 09/14/22 1117   97 9 °F (36 6 °C) 66 18 133/82 97 %      Temp Source Heart Rate Source Patient Position - Orthostatic VS BP Location FiO2 (%)   09/14/22 1300 09/14/22 1117 09/14/22 1117 09/14/22 1117 --   Oral Monitor Sitting Left arm       Pain Score       --                  Vitals:    09/14/22 1215 09/14/22 1230 09/14/22 1300 09/14/22 1330   BP: 117/73 122/66 143/80 133/86   Pulse: 59 61 69 62   Patient Position - Orthostatic VS:             Visual Acuity      ED Medications  Medications   iohexol (OMNIPAQUE) 350 MG/ML injection (SINGLE-DOSE) 85 mL (85 mL Intravenous Given 9/14/22 1255)       Diagnostic Studies  Results Reviewed     Procedure Component Value Units Date/Time    TSH [[id number]]  (Normal) Collected: 09/14/22 1131    Lab Status: Final result Specimen: Blood from Arm, Left Updated: 09/14/22 1227     TSH 3RD GENERATON 0 884 uIU/mL     Narrative:      Patients undergoing fluorescein dye angiography may retain small amounts of fluorescein in the body for 48-72 hours post procedure  Samples containing fluorescein can produce falsely depressed TSH values  If the patient had this procedure,a specimen should be resubmitted post fluorescein clearance        HS Troponin 0hr (reflex protocol) [[id number]]  (Normal) Collected: 09/14/22 1131    Lab Status: Final result Specimen: Blood from Arm, Left Updated: 09/14/22 1207     hs TnI 0hr <2 ng/L     D-Dimer [[id number]]  (Abnormal) Collected: 09/14/22 1131    Lab Status: Final result Specimen: Blood from Arm, Left Updated: 09/14/22 1200     D-Dimer, Quant 0 70 ug/ml FEU     Basic metabolic panel [[id number]] Collected: 09/14/22 1131    Lab Status: Final result Specimen: Blood from Arm, Left Updated: 09/14/22 1157     Sodium 135 mmol/L      Potassium 4 2 mmol/L      Chloride 103 mmol/L      CO2 27 mmol/L      ANION GAP 5 mmol/L      BUN 15 mg/dL      Creatinine 0 62 mg/dL      Glucose 99 mg/dL      Calcium 8 8 mg/dL      eGFR 118 ml/min/1 73sq m     Narrative:      [place] guidelines for Chronic Kidney Disease (CKD):     Stage 1 with normal or high GFR (GFR > 90 mL/min/1 73 square meters)    Stage 2 Mild CKD (GFR = 60-89 mL/min/1 73 square meters)    Stage 3A Moderate CKD (GFR = 45-59 mL/min/1 73 square meters)    Stage 3B Moderate CKD (GFR = 30-44 mL/min/1 73 square meters)    Stage 4 Severe CKD (GFR = 15-29 mL/min/1 73 square meters)    Stage 5 End Stage CKD (GFR <15 mL/min/1 73 square meters)  Note: GFR calculation is accurate only with a steady state creatinine    CBC and differential [[id number]]  (Abnormal) Collected: 09/14/22 1131    Lab Status: Final result Specimen: Blood from Arm, Left Updated: 09/14/22 1143     WBC 10 79 Thousand/uL      RBC 3 86 Million/uL      Hemoglobin 12 7 g/dL      Hematocrit 37 8 %      MCV 98 fL      MCH 32 9 pg      MCHC 33 6 g/dL      RDW 12 0 %      MPV 9 7 fL      Platelets 182 Thousands/uL      nRBC 0 /100 WBCs      Neutrophils Relative 77 %      Immat GRANS % 1 %      Lymphocytes Relative 16 %      Monocytes Relative 5 %      Eosinophils Relative 1 %      Basophils Relative 0 %      Neutrophils Absolute 8 28 Thousands/µL      Immature Grans Absolute 0 05 Thousand/uL      Lymphocytes Absolute 1 75 Thousands/µL      Monocytes Absolute 0 57 Thousand/µL      Eosinophils Absolute 0 12 Thousand/µL      Basophils Absolute 0 02 Thousands/µL     POCT pregnancy, urine [[id number]]  (Normal) Resulted: 09/14/22 1134    Lab Status: Final result Updated: 09/14/22 1134     EXT PREG TEST UR (Ref: Negative) negative     Control valid                 CTA ED chest PE study   Final Result by Johann Lee MD (09/14 1329)      No pulmonary embolism or additional acute abnormality in the chest   Left basilar linear atelectasis        Small amount of nonmasslike soft tissue in the anterior mediastinum, which may represent residual thymus gland versus thymic hyperplasia  No suspicious features  Workstation performed: [id number]         XR chest 2 views   ED Interpretation by Maliha Gonzalez MD (09/14 1147)   No acute abnormality      Final Result by Jose Cruz Briceño MD (09/14 1209)      No acute cardiopulmonary disease  Workstation performed: [id number]                    Procedures  ECG 12 Lead Documentation Only    Date/Time: 9/14/2022 11:21 AM  Performed by: Maliha Gonzalez MD  Authorized by: Maliha Gonzalez MD     Indications / Diagnosis:  Chest pain  ECG reviewed by me, the ED Provider: yes    Patient location:  ED  Previous ECG:     Previous ECG:  Unavailable  Interpretation:     Interpretation: normal    Rate:     ECG rate:  62    ECG rate assessment: normal    Rhythm:     Rhythm: sinus rhythm    Ectopy:     Ectopy: none    QRS:     QRS axis:  Normal    QRS intervals:  Normal  Conduction:     Conduction: normal    ST segments:     ST segments:  Normal  T waves:     T waves: normal               ED Course  ED Course as of 09/14/22 1358   Wed Sep 14, 2022   1146 PREGNANCY TEST URINE: negative   1146 WBC(!): 10 79   1146 Neutrophils %(!): 77   5063 Basic metabolic panel  Grossly normal   1215 D-Dimer, Quant(!): 0 70   1215 hs TnI 0hr: <2   1215 XR chest 2 views  IMPRESSION:     No acute cardiopulmonary disease  1218 Discussed resulted with patient; d dimer elevated  Recommend CTA to evaluate for PE  Patient agrees  46 TSH 3RD GENERATON: 0 884   1331 CTA ED chest PE study  IMPRESSION:     No pulmonary embolism or additional acute abnormality in the chest   Left basilar linear atelectasis      Small amount of nonmasslike soft tissue in the anterior mediastinum, which may represent residual thymus gland versus thymic hyperplasia  No suspicious features               HEART Risk Score Flowsheet Row Most Recent Value   Heart Score Risk Calculator    History 0 Filed at: 09/14/2022 1245   ECG 0 Filed at: 09/14/2022 1245   Age 0 Filed at: 09/14/2022 1245   Risk Factors 1 Filed at: 09/14/2022 1245   Troponin 0 Filed at: 09/14/2022 1245   HEART Score 1 Filed at: 09/14/2022 1245                PERC Rule for PE    Flowsheet Row Most Recent Value   PERC Rule for PE    Age >=50 0 Filed at: 09/14/2022 1117   HR >=100 0 Filed at: 09/14/2022 1117   O2 Sat on room air < 95% 0 Filed at: 09/14/2022 1117   History of PE or DVT 0 Filed at: 09/14/2022 1117   Recent trauma or surgery 1 Filed at: 09/14/2022 1117   Hemoptysis 0 Filed at: 09/14/2022 1117   Exogenous estrogen 0 Filed at: 09/14/2022 1117   Unilateral leg swelling 0 Filed at: 09/14/2022 1117   PERC Rule for PE Results 1 Filed at: 09/14/2022 1117                  Wells' Criteria for PE    Flowsheet Row Most Recent Value   Wells' Criteria for PE    Clinical signs and symptoms of DVT 0 Filed at: 09/14/2022 1358   PE is primary diagnosis or equally likely 3 Filed at: 09/14/2022 1358   HR >100 0 Filed at: 09/14/2022 1358   Immobilization at least 3 days or Surgery in the previous 4 weeks 0 Filed at: 09/14/2022 1358   Previous, objectively diagnosed PE or DVT 0 Filed at: 09/14/2022 1358   Hemoptysis 0 Filed at: 09/14/2022 1358   Malignancy with treatment within 6 months or palliative 0 Filed at: 09/14/2022 1358   Wells' Criteria Total 3 Filed at: 09/14/2022 1358                MDM  Number of Diagnoses or Management Options  Atelectasis  Chest pain, unspecified type  Diagnosis management comments: 29-year-old female presenting for evaluation of chest pain  Differential diagnosis includes acute coronary syndrome, dysrhythmia, anemia, dehydration, pneumonia, pneumothorax, pulmonary edema, pulmonary embolism  Patient was recently pregnant and has a risk factor for pulmonary embolism, especially in the context of occasional social tobacco use    No other signs or symptoms of DVT  EKG shows normal sinus rhythm without evidence of ischemia or malignant dysrhythmia  Initial troponin is less than 2  Patient's heart score is 1; she is at low risk for major acute cardiac event in the next 30 days  Chest x-ray is without evidence of acute pulmonary abnormality  Patient has mild leukocytosis, which is nonspecific  No evidence of anemia or dehydration  D-dimer is elevated; discussed with the patient and elected to perform CTA of the chest to evaluate for pulmonary embolism  CT of the chest is negative for PE and other acute abnormalities  This does show left linear atelectasis, which I suspect is causing the patient's sharp left-sided chest pain under her breast that is worse with deep breathing  No evidence of infectious etiologies, either  Patient informed of residual thymus tissue and the likelihood that this is benign  Patient counseled on supportive measures at home and offered incentive spirometry, lidocaine patch, and albuterol MDI; patient states she has an inhaler at home from her previous illness and will try it to see if it makes a difference in her symptoms  Will also continue Tylenol and ibuprofen to facilitate deep breathing and improve upon deep breathing exercises at home  Encouraged to follow-up with PCP  Return precautions discussed  Patient is in agreement and understanding of these instructions  Disposition  Final diagnoses:   Chest pain, unspecified type   Atelectasis     Time reflects when diagnosis was documented in both MDM as applicable and the Disposition within this note     Time User Action Codes Description Comment    9/14/2022  1:37 PM Poppy Nunes Add [R07 9] Chest pain, unspecified type     9/14/2022  1:38 PM Mateo Herring Add [J98 11] Atelectasis       ED Disposition     ED Disposition   Discharge    Condition   Stable    Date/Time   Wed Sep 14, 2022  1:37 PM    Comment   Macy Young discharge to home/self care  Follow-up Information     Follow up With Specialties Details Why Contact Info    [facility] Schedule an appointment as soon as possible for a visit   [address]  [address] [place] [address] [place] [id number] [phone]            Discharge Medication List as of 9/14/2022  1:38 PM      CONTINUE these medications which have NOT CHANGED    Details   cetirizine (ZyrTEC) 5 MG tablet Take 5 mg by mouth daily, Historical Med      sertraline (ZOLOFT) 50 mg tablet Take 50 mg by mouth daily, Historical Med             No discharge procedures on file      PDMP Review     None          ED Provider  Electronically Signed by           Ever Thrasher MD  09/14/22 [id number]

## 2024-01-05 ENCOUNTER — OFFICE VISIT (OUTPATIENT)
Dept: FAMILY MEDICINE CLINIC | Facility: CLINIC | Age: 39
End: 2024-01-05
Payer: COMMERCIAL

## 2024-01-05 VITALS
HEART RATE: 88 BPM | SYSTOLIC BLOOD PRESSURE: 130 MMHG | WEIGHT: 171.4 LBS | TEMPERATURE: 97.6 F | RESPIRATION RATE: 16 BRPM | DIASTOLIC BLOOD PRESSURE: 84 MMHG | BODY MASS INDEX: 25.39 KG/M2 | OXYGEN SATURATION: 96 % | HEIGHT: 69 IN

## 2024-01-05 DIAGNOSIS — Z00.00 HEALTHCARE MAINTENANCE: Primary | ICD-10-CM

## 2024-01-05 DIAGNOSIS — E32.0 PERSISTENT HYPERPLASIA OF THYMUS (HCC): ICD-10-CM

## 2024-01-05 DIAGNOSIS — F32.A DEPRESSION, UNSPECIFIED DEPRESSION TYPE: ICD-10-CM

## 2024-01-05 DIAGNOSIS — F41.9 ANXIETY: ICD-10-CM

## 2024-01-05 PROCEDURE — 99385 PREV VISIT NEW AGE 18-39: CPT | Performed by: NURSE PRACTITIONER

## 2024-01-05 NOTE — ASSESSMENT & PLAN NOTE
- Reviewed immunizations and screenings.   - UTD with dental and GYN exams.   - Routine labs ordered.

## 2024-01-05 NOTE — PROGRESS NOTES
Name: Christiana Contreras      : 1985      MRN: [id number]  Encounter Provider: JOSE Rebolledo  Encounter Date: 2024   Encounter department: [facility]    Assessment & Plan     1. Healthcare maintenance  Assessment & Plan:  - Reviewed immunizations and screenings.   - UTD with dental and GYN exams.   - Routine labs ordered.     Orders:  -     CBC and differential; Future  -     Comprehensive metabolic panel; Future  -     Lipid Panel with Direct LDL reflex; Future  -     TSH, 3rd generation with Free T4 reflex; Future    2. Anxiety  Assessment & Plan:  - Well controlled on Zoloft 50 mg daily. Continue same.   - Will continue to monitor.     Orders:  -     sertraline (ZOLOFT) 50 mg tablet; Take 1 tablet (50 mg total) by mouth daily    3. Depression, unspecified depression type  Assessment & Plan:  - Well controlled on Zoloft 50 mg daily. Continue same.   - Will continue to monitor.     Orders:  -     sertraline (ZOLOFT) 50 mg tablet; Take 1 tablet (50 mg total) by mouth daily    4. Persistent hyperplasia of thymus (HCC)  Assessment & Plan:  - CT of chest ordered for follow up.     Orders:  -     CT chest wo contrast; Future; Expected date: 2024         Subjective     Patient presents to office today to establish care. Has PMH of anxiety and depression for which she takes Zoloft. Also reports history of thymic hyperplasia that was noted on CT during ER visit for chest pain. She did have follow up at 6 months and was supposed to get another follow up CT a year later. She denies any other concerns or complaints today.        Review of Systems   Constitutional:  Negative for fatigue and fever.   HENT:  Negative for congestion, nosebleeds and trouble swallowing.    Eyes:  Negative for visual disturbance.   Respiratory:  Negative for cough and shortness of breath.    Cardiovascular:  Negative for chest pain and palpitations.   Gastrointestinal:  Negative for abdominal  pain and blood in stool.   Endocrine: Negative for cold intolerance and heat intolerance.   Genitourinary:  Negative for difficulty urinating, dysuria and frequency.   Musculoskeletal:  Negative for gait problem.   Skin:  Negative for rash.   Neurological:  Negative for dizziness, syncope and headaches.   Hematological:  Negative for adenopathy.   Psychiatric/Behavioral:  Negative for behavioral problems.        Past Medical History:   Diagnosis Date   • Allergies    • Anxiety    • Depression      Past Surgical History:   Procedure Laterality Date   • DILATION AND CURETTAGE OF UTERUS       Family History   Problem Relation Age of Onset   • Breast cancer Paternal Grandmother    • Breast cancer Paternal Aunt      Social History     Socioeconomic History   • Marital status: /Civil Union     Spouse name: None   • Number of children: None   • Years of education: None   • Highest education level: None   Occupational History   • None   Tobacco Use   • Smoking status: Some Days   • Smokeless tobacco: Never   Substance and Sexual Activity   • Alcohol use: Yes     Comment: socially   • Drug use: No   • Sexual activity: None   Other Topics Concern   • None   Social History Narrative   • None     Social Determinants of Health     Financial Resource Strain: Not on file   Food Insecurity: Not on file   Transportation Needs: Not on file   Physical Activity: Not on file   Stress: Not on file   Social Connections: Not on file   Intimate Partner Violence: Not on file   Housing Stability: Not on file     Current Outpatient Medications on File Prior to Visit   Medication Sig   • cetirizine (ZyrTEC) 5 MG tablet Take 5 mg by mouth daily   • [DISCONTINUED] sertraline (ZOLOFT) 50 mg tablet Take 50 mg by mouth daily     Allergies   Allergen Reactions   • Penicillins      Immunization History   Administered Date(s) Administered   • COVID-19 PFIZER VACCINE 0.3 ML IM 12/30/2020, 02/10/2021   • DTP 01/21/1988, 03/17/1988, 05/11/1988,  "01/24/1990, 05/13/1993   • HPV Quadrivalent 03/12/2007, 05/15/2007, 09/12/2007   • Hep B, Adolescent or Pediatric 02/02/1998, 03/02/1998, 08/12/1998   • INFLUENZA 11/04/2013, 11/13/2018, 10/23/2020   • IPV 01/21/1988, 03/17/1988, 01/24/1990, 05/13/1993   • Influenza Injectable, MDCK, Preservative Free, Quadrivalent, 0.5 mL 09/17/2019, 11/28/2022   • MMR 10/17/1994, 05/12/1998, 05/29/2018   • Td (adult), Unspecified 07/08/2003, 06/09/2011   • Tuberculin Skin Test-PPD Intradermal 07/23/2010, 04/26/2016, 04/28/2018, 05/07/2018       Objective     /84 (BP Location: Left arm, Patient Position: Sitting, Cuff Size: Standard)   Pulse 88   Temp 97.6 °F (36.4 °C) (Tympanic)   Resp 16   Ht 5' 9\" (1.753 m)   Wt 77.7 kg (171 lb 6.4 oz)   SpO2 96%   BMI 25.31 kg/m²     Physical Exam  Vitals and nursing note reviewed.   Constitutional:       General: She is not in acute distress.     Appearance: Normal appearance. She is not ill-appearing.   HENT:      Head: Normocephalic and atraumatic.      Right Ear: Tympanic membrane, ear canal and external ear normal.      Left Ear: Tympanic membrane, ear canal and external ear normal.      Nose: Nose normal.      Mouth/Throat:      Mouth: Mucous membranes are moist.   Eyes:      Conjunctiva/sclera: Conjunctivae normal.      Pupils: Pupils are equal, round, and reactive to light.   Cardiovascular:      Rate and Rhythm: Normal rate and regular rhythm.      Heart sounds: Normal heart sounds.   Pulmonary:      Effort: Pulmonary effort is normal.      Breath sounds: Normal breath sounds.   Abdominal:      General: Bowel sounds are normal.      Palpations: Abdomen is soft.   Musculoskeletal:         General: Normal range of motion.      Cervical back: Normal range of motion.   Lymphadenopathy:      Cervical: No cervical adenopathy.   Skin:     General: Skin is warm and dry.   Neurological:      Mental Status: She is alert and oriented to person, place, and time.   Psychiatric:         " Mood and Affect: Mood normal.         Behavior: Behavior normal.       JOSE Rebolledo

## 2024-02-20 ENCOUNTER — TELEPHONE (OUTPATIENT)
Dept: OTHER | Facility: OTHER | Age: 39
End: 2024-02-20

## 2024-02-21 PROBLEM — Z00.00 HEALTHCARE MAINTENANCE: Status: RESOLVED | Noted: 2024-01-05 | Resolved: 2024-02-21

## 2024-02-21 NOTE — TELEPHONE ENCOUNTER
Pt called to make a new pt apt with the office. She stated she does not have a provider preference. Please give her a call to schedule.

## 2024-02-29 ENCOUNTER — TELEPHONE (OUTPATIENT)
Dept: FAMILY MEDICINE CLINIC | Facility: CLINIC | Age: 39
End: 2024-02-29

## 2024-02-29 NOTE — TELEPHONE ENCOUNTER
Per  Allison GARCIA, HNL results showed slightly elevated Cholesterol , recommended healthy diet and regular exercise. Rest of labs stable.  Pt aware of results and agrees with recommendations.

## 2024-03-05 NOTE — PROGRESS NOTES
Subjective      Christiana Contreras is a 38 y.o. female who presents for annual well woman exam.  Last Pap smear 21 NILM/ HR HPV(-). Periods are regular every 20-25 days, lasting  2-3  days. No intermenstrual bleeding, spotting, or discharge.     Patient is concerned with decreasing length of menstrual cycles.  Was typically every 28 to 32 days and is now 20 to 25 days.  Other symptoms include increase in acne around chin and cheeks.  Has met with dermatology and been on many different creams, lotions and soaps without significant improvement.  Hair loss on scalp and hair growth on face.  Also admits to weight gain about 10 pounds in the last 6 months without changes in activity or diet.    Current contraception: coitus interruptus  History of abnormal Pap smear: no  Family history of uterine or ovarian cancer: no  Regular self breast exam: no  History of abnormal mammogram: mammogram to begin at age 42 unless otherwise clinically indicated   Family history of breast cancer: yes - Paternal grandmother & aunt   History of abnormal lipids: no  Gardasil vaccine: completed vaccine       Menstrual History:  OB History          1    Para   1    Term   1            AB        Living   1         SAB        IAB        Ectopic        Multiple        Live Births   1           Obstetric Comments   Menarche age 16              Menarche age: 16  Patient's last menstrual period was 2024.  Period Cycle (Days):  (monthly 20-25 days)  Period Duration (Days): 2-3  Period Pattern: Regular  Menstrual Flow: Heavy, Light, Moderate (1 day heavy)  Menstrual Control: Maxi pad, Tampon  Menstrual Control Change Freq (Hours): heavy day- every 3 hours  Dysmenorrhea: (!) Mild  Dysmenorrhea Symptoms: Nausea, Diarrhea    The following portions of the patient's history were reviewed and updated as appropriate: allergies, current medications, past family history, past medical history, past social history, past surgical history, and  "problem list.    Review of Systems  Pertinent items are noted in HPI.      Objective      /76   Ht 5' 9\" (1.753 m)   Wt 79.3 kg (174 lb 12.8 oz)   LMP 03/06/2024   BMI 25.81 kg/m²     General:   alert and oriented, in no acute distress, alert, appears stated age, and cooperative   Heart: regular rate and rhythm, S1, S2 normal, no murmur, click, rub or gallop   Lungs: clear to auscultation bilaterally   Abdomen: soft, non-tender, without masses or organomegaly   Vulva: normal, Bartholin's, Urethra, McGregor's normal   Vagina: normal mucosa, normal discharge, no palpable nodules   Cervix: no cervical motion tenderness and no lesions   Uterus: normal size, non-tender, normal shape and consistency   Adnexa: normal adnexa and no mass, fullness, tenderness   Breast: breasts appear normal, no suspicious masses, no skin or nipple changes or axillary nodes.              Assessment      @well woman  no contraindication to begin hormonal therapy@ .     Plan      All questions answered.  Blood tests: Estradiol, Free T4 level, FSH, LH, Progesterone level, and testosterone.  Breast self exam technique reviewed and patient encouraged to perform self-exam monthly.  Contraception: coitus interruptus.  Diagnosis explained in detail, including differential.  Dietary diary.  Discussed healthy lifestyle modifications.  Educational material distributed.  Follow up in 1 year for annual exam; after testing to discuss's plan of care.  Follow up as needed.  Pelvic ultrasound.  Thin prep Pap smear.  Breast awareness reviewed   Changes in menses, acne, weight gain and hair loss-reviewed options briefly.  Patient will return to office after testing to review and discuss plan of care  Encouraged healthy diet, exercise and lifestyle  Had follow-up with PCP as needed  Encouraged seasonal influenza vaccination  Encouraged social distancing, good hand hygiene, avoidance of crowds and masking.  Written information provided about COVID-19  "   Will call / GitHubhart message with results  VBI-    BMI Counseling: Body mass index is 25.81 kg/m². The BMI is above normal. Nutrition recommendations include 3-5 servings of fruits/vegetables daily. Exercise recommendations include exercising 3-5 times per week.

## 2024-03-06 ENCOUNTER — APPOINTMENT (OUTPATIENT)
Dept: LAB | Facility: MEDICAL CENTER | Age: 39
End: 2024-03-06
Payer: COMMERCIAL

## 2024-03-06 ENCOUNTER — TELEPHONE (OUTPATIENT)
Dept: ADMINISTRATIVE | Facility: OTHER | Age: 39
End: 2024-03-06

## 2024-03-06 ENCOUNTER — OFFICE VISIT (OUTPATIENT)
Dept: OBGYN CLINIC | Facility: MEDICAL CENTER | Age: 39
End: 2024-03-06
Payer: COMMERCIAL

## 2024-03-06 VITALS
SYSTOLIC BLOOD PRESSURE: 128 MMHG | BODY MASS INDEX: 25.89 KG/M2 | WEIGHT: 174.8 LBS | HEIGHT: 69 IN | DIASTOLIC BLOOD PRESSURE: 76 MMHG

## 2024-03-06 DIAGNOSIS — L68.0 HIRSUTISM: ICD-10-CM

## 2024-03-06 DIAGNOSIS — N93.9 ABNORMAL UTERINE BLEEDING (AUB): ICD-10-CM

## 2024-03-06 DIAGNOSIS — L70.9 ACNE, UNSPECIFIED ACNE TYPE: ICD-10-CM

## 2024-03-06 DIAGNOSIS — Z01.419 WELL WOMAN EXAM WITH ROUTINE GYNECOLOGICAL EXAM: Primary | ICD-10-CM

## 2024-03-06 LAB
ESTRADIOL SERPL-MCNC: 46.4 PG/ML
FSH SERPL-ACNC: 7.5 MIU/ML
LH SERPL-ACNC: 5 MIU/ML
PROGEST SERPL-MCNC: 0.51 NG/ML
T4 FREE SERPL-MCNC: 0.61 NG/DL (ref 0.61–1.12)

## 2024-03-06 PROCEDURE — 84402 ASSAY OF FREE TESTOSTERONE: CPT

## 2024-03-06 PROCEDURE — S0610 ANNUAL GYNECOLOGICAL EXAMINA: HCPCS | Performed by: NURSE PRACTITIONER

## 2024-03-06 PROCEDURE — 84439 ASSAY OF FREE THYROXINE: CPT

## 2024-03-06 PROCEDURE — 36415 COLL VENOUS BLD VENIPUNCTURE: CPT

## 2024-03-06 PROCEDURE — 84403 ASSAY OF TOTAL TESTOSTERONE: CPT

## 2024-03-06 PROCEDURE — 83001 ASSAY OF GONADOTROPIN (FSH): CPT

## 2024-03-06 PROCEDURE — 84144 ASSAY OF PROGESTERONE: CPT

## 2024-03-06 PROCEDURE — 83002 ASSAY OF GONADOTROPIN (LH): CPT

## 2024-03-06 PROCEDURE — 82670 ASSAY OF TOTAL ESTRADIOL: CPT

## 2024-03-06 RX ORDER — SERTRALINE HYDROCHLORIDE 100 MG/1
100 TABLET, FILM COATED ORAL DAILY
COMMUNITY
Start: 2024-02-26

## 2024-03-06 NOTE — TELEPHONE ENCOUNTER
----- Message from JOSE Arevalo sent at 3/5/2024 12:40 PM EST -----  03/05/24 12:40 PM    Hello, our patient Christiana Contreras has had Pap Smear (HPV) aka Cervical Cancer Screening completed/performed. Please assist in updating the patient chart by pulling the document from lab Tab within Chart Review. The date of service is 7/8/21.     Thank you,  JOSE Arevalo  [facility]

## 2024-03-07 LAB
TESTOST FREE SERPL-MCNC: 1.7 PG/ML (ref 0–4.2)
TESTOST SERPL-MCNC: 30 NG/DL (ref 8–60)

## 2024-03-08 ENCOUNTER — HOSPITAL ENCOUNTER (OUTPATIENT)
Dept: CT IMAGING | Facility: HOSPITAL | Age: 39
End: 2024-03-08
Payer: COMMERCIAL

## 2024-03-08 DIAGNOSIS — E32.0 PERSISTENT HYPERPLASIA OF THYMUS (HCC): ICD-10-CM

## 2024-03-08 PROCEDURE — G1004 CDSM NDSC: HCPCS

## 2024-03-08 PROCEDURE — 71250 CT THORAX DX C-: CPT

## 2024-03-11 NOTE — TELEPHONE ENCOUNTER
Upon review of the In Basket request we were able to locate, review, and update the patient chart as requested for Pap Smear (HPV) aka Cervical Cancer Screening.    Any additional questions or concerns should be emailed to the Practice Liaisons via the appropriate education email address, please do not reply via In Basket.    Thank you  Yesi Alexander

## 2024-03-13 ENCOUNTER — HOSPITAL ENCOUNTER (OUTPATIENT)
Dept: ULTRASOUND IMAGING | Facility: HOSPITAL | Age: 39
Discharge: HOME | End: 2024-03-13
Payer: COMMERCIAL

## 2024-03-13 DIAGNOSIS — L70.9 ACNE, UNSPECIFIED ACNE TYPE: ICD-10-CM

## 2024-03-13 DIAGNOSIS — L68.0 HIRSUTISM: ICD-10-CM

## 2024-03-13 DIAGNOSIS — N93.9 ABNORMAL UTERINE BLEEDING (AUB): ICD-10-CM

## 2024-03-13 PROCEDURE — 76830 TRANSVAGINAL US NON-OB: CPT

## 2024-03-13 PROCEDURE — 76856 US EXAM PELVIC COMPLETE: CPT

## 2024-03-15 ENCOUNTER — TELEPHONE (OUTPATIENT)
Age: 39
End: 2024-03-15

## 2024-03-15 ENCOUNTER — TELEPHONE (OUTPATIENT)
Dept: FAMILY MEDICINE CLINIC | Facility: CLINIC | Age: 39
End: 2024-03-15

## 2024-03-15 NOTE — TELEPHONE ENCOUNTER
----- Message from JOSE Arevalo sent at 3/15/2024  8:30 AM EDT -----  Please call patient pelvic ultrasound is essentially normal.  She does have fibroids.  Given normal labs and pelvic ultrasound recommend appointment to discuss options for menstrual/symptom control    Thank you

## 2024-03-15 NOTE — TELEPHONE ENCOUNTER
----- Message from JOSE Rebolledo sent at 3/15/2024  6:55 AM EDT -----  CT shows stable and unchanged findings since 2022.

## 2024-03-15 NOTE — TELEPHONE ENCOUNTER
Per consent form patient does not was voicemail left.    US and labs were all normal.  Needs appointment for OV

## 2024-03-24 ENCOUNTER — OFFICE VISIT (OUTPATIENT)
Dept: URGENT CARE | Age: 39
End: 2024-03-24
Payer: COMMERCIAL

## 2024-03-24 VITALS
WEIGHT: 167 LBS | SYSTOLIC BLOOD PRESSURE: 122 MMHG | BODY MASS INDEX: 24.73 KG/M2 | HEART RATE: 78 BPM | DIASTOLIC BLOOD PRESSURE: 81 MMHG | HEIGHT: 69 IN | TEMPERATURE: 98.6 F | RESPIRATION RATE: 18 BRPM | OXYGEN SATURATION: 98 %

## 2024-03-24 DIAGNOSIS — M54.50 LUMBAR BACK PAIN: ICD-10-CM

## 2024-03-24 DIAGNOSIS — M62.830 LUMBAR PARASPINAL MUSCLE SPASM: ICD-10-CM

## 2024-03-24 DIAGNOSIS — M54.42 ACUTE LEFT-SIDED LOW BACK PAIN WITH LEFT-SIDED SCIATICA: Primary | ICD-10-CM

## 2024-03-24 PROCEDURE — 99213 OFFICE O/P EST LOW 20 MIN: CPT | Performed by: EMERGENCY MEDICINE

## 2024-03-24 RX ORDER — MELOXICAM 15 MG/1
15 TABLET ORAL DAILY
Qty: 14 TABLET | Refills: 0 | Status: SHIPPED | OUTPATIENT
Start: 2024-03-24 | End: 2024-04-07

## 2024-03-24 RX ORDER — PREDNISONE 20 MG/1
40 TABLET ORAL DAILY
Qty: 10 TABLET | Refills: 0 | Status: SHIPPED | OUTPATIENT
Start: 2024-03-24 | End: 2024-03-29

## 2024-03-24 RX ORDER — METHOCARBAMOL 750 MG/1
750 TABLET, FILM COATED ORAL 3 TIMES DAILY
Qty: 32 TABLET | Refills: 0 | Status: SHIPPED | OUTPATIENT
Start: 2024-03-24 | End: 2024-03-31

## 2024-03-24 NOTE — PROGRESS NOTES
"  [facility]        NAME: Christiana Contreras is a 38 y.o. female  : 1985    MRN: [id number]  DATE: 2024  TIME: 4:20 PM    Assessment and Plan   Acute left-sided low back pain with left-sided sciatica [M54.42]  1. Acute left-sided low back pain with left-sided sciatica        2. Lumbar back pain        3. Lumbar paraspinal muscle spasm  methocarbamol (Robaxin-750) 750 mg tablet    meloxicam (Mobic) 15 mg tablet        No red flag signs or symptoms elicited on history or examination.  Patient is otherwise neurologically intact.  Will discharge on short course of Mobic, Robaxin and prednisone for symptomatic relief.  Advised patient to seek care in ED if symptoms worsen, otherwise follow-up closely with her PCP as directed.  All questions were answered at bedside, patient was amenable to plan and voiced understanding.  Patient Instructions       Follow up with PCP in 3-5 days.  Proceed to  ER if symptoms worsen.    If tests have been performed at [facility], our office will contact you with results if changes need to be made to the care plan discussed with you at the visit.  You [facility] full [facility].    Chief Complaint     Chief Complaint   Patient presents with    Back Pain     Low back pain, left leg numbness began yesterday         History of Present Illness       38-year-old female with history of uterine fibroids, anxiety, depression presents for chief complaint of left-sided low back pain which began Friday evening.  Patient states that earlier in the day on Friday, prior to onset of symptoms she did receive a massage.  She endorses radicular symptoms down her posterior left leg which courses over to the anterior aspect of her left leg once past the knee stopping at mid shin.  She states that her pain was worse yesterday and is somewhat better today however she has been taking some \"old Flexeril\" as well as ibuprofen.  She denies any prior IV drug use, night " sweats, unintended weight loss, recent spinal instrumentation or injuries, saddle anesthesia, leg weakness or bowel or bladder symptoms.  She denies prior injury or trauma to her low back.    Back Pain  This is a new problem. The current episode started in the past 7 days. The problem occurs intermittently. The problem has been waxing and waning since onset. The pain is present in the lumbar spine. The quality of the pain is described as cramping. The pain radiates to the left knee and left thigh. The pain is at a severity of 4/10. The pain is moderate. The symptoms are aggravated by bending. Pertinent negatives include no abdominal pain, bladder incontinence, bowel incontinence, chest pain, dysuria, fever, headaches, leg pain, numbness, paresis, paresthesias, pelvic pain, perianal numbness, tingling, weakness or weight loss. She has tried muscle relaxant and NSAIDs for the symptoms. The treatment provided mild relief.       Review of Systems   Review of Systems   Constitutional:  Negative for activity change, appetite change, chills, diaphoresis, fatigue, fever, unexpected weight change and weight loss.   HENT:  Negative for ear pain and sore throat.    Eyes:  Negative for pain and visual disturbance.   Respiratory:  Negative for cough and shortness of breath.    Cardiovascular:  Negative for chest pain and palpitations.   Gastrointestinal:  Negative for abdominal distention, abdominal pain, anal bleeding, blood in stool, bowel incontinence, constipation, diarrhea, nausea, rectal pain and vomiting.   Genitourinary:  Negative for bladder incontinence, decreased urine volume, difficulty urinating, dyspareunia, dysuria, flank pain, frequency, hematuria and pelvic pain.   Musculoskeletal:  Positive for arthralgias and back pain. Negative for gait problem, joint swelling, myalgias, neck pain and neck stiffness.   Skin:  Negative for color change, pallor, rash and wound.   Neurological:  Negative for tingling, seizures,  "syncope, weakness, light-headedness, numbness, headaches and paresthesias.   All other systems reviewed and are negative.        Current Medications       Current Outpatient Medications:     cetirizine (ZyrTEC) 5 MG tablet, Take 5 mg by mouth daily, Disp: , Rfl:     meloxicam (Mobic) 15 mg tablet, Take 1 tablet (15 mg total) by mouth daily for 14 days, Disp: 14 tablet, Rfl: 0    methocarbamol (Robaxin-750) 750 mg tablet, Take 1 tablet (750 mg total) by mouth 3 (three) times a day for 7 days For the first 2 days, take 1500 mg 3 times daily. Starting day 3, take 750 mg 4 times daily until finished., Disp: 32 tablet, Rfl: 0    sertraline (ZOLOFT) 100 mg tablet, Take 100 mg by mouth daily, Disp: , Rfl:     Current Allergies     Allergies as of 03/24/2024 - Reviewed 03/24/2024   Allergen Reaction Noted    Penicillins  11/16/2018            The following portions of the patient's history were reviewed and updated as appropriate: allergies, current medications, past family history, past medical history, past social history, past surgical history and problem list.     Past Medical History:   Diagnosis Date    Abnormal Pap smear of cervix     Allergies     Anxiety     Depression        Past Surgical History:   Procedure Laterality Date    GYNECOLOGIC CRYOSURGERY         Family History   Problem Relation Age of Onset    Anemia Mother     Testicular cancer Father     Drug abuse Brother     No Known Problems Son     Lymphoma Maternal Grandmother     Stroke Maternal Grandfather     Breast cancer Paternal Grandmother     No Known Problems Paternal Grandfather     Breast cancer Paternal Aunt     Mental illness Half-Sister     Colon cancer Neg Hx     Ovarian cancer Neg Hx          Medications have been verified.        Objective   /81   Pulse 78   Temp 98.6 °F (37 °C)   Resp 18   Ht 5' 9\" (1.753 m)   Wt 75.8 kg (167 lb)   LMP 03/06/2024   SpO2 98%   BMI 24.66 kg/m²   Patient's last menstrual period was 03/06/2024.     "   Physical Exam     Physical Exam  Vitals and nursing note reviewed.   Constitutional:       General: She is not in acute distress.     Appearance: Normal appearance. She is normal weight. She is not ill-appearing, toxic-appearing or diaphoretic.   HENT:      Head: Normocephalic and atraumatic.      Right Ear: External ear normal.      Left Ear: External ear normal.      Nose: Nose normal.      Mouth/Throat:      Mouth: Mucous membranes are moist.      Pharynx: No oropharyngeal exudate or posterior oropharyngeal erythema.   Eyes:      General: No scleral icterus.        Right eye: No discharge.         Left eye: No discharge.      Extraocular Movements: Extraocular movements intact.      Pupils: Pupils are equal, round, and reactive to light.   Cardiovascular:      Rate and Rhythm: Normal rate and regular rhythm.      Pulses: Normal pulses.           Carotid pulses are 2+ on the right side and 2+ on the left side.       Radial pulses are 2+ on the right side and 2+ on the left side.      Heart sounds: No murmur heard.     No friction rub. No gallop.   Pulmonary:      Effort: Pulmonary effort is normal. No respiratory distress.      Breath sounds: Normal breath sounds. No stridor. No wheezing, rhonchi or rales.   Chest:      Chest wall: No tenderness.   Abdominal:      General: Abdomen is flat. There is no distension.      Palpations: Abdomen is soft. There is no mass.      Tenderness: There is no abdominal tenderness. There is no right CVA tenderness, left CVA tenderness, guarding or rebound.      Hernia: No hernia is present.   Musculoskeletal:         General: Tenderness present. No swelling, deformity or signs of injury.      Cervical back: Normal, normal range of motion and neck supple.      Thoracic back: Normal.      Lumbar back: Spasms and tenderness present. No swelling, edema, deformity, signs of trauma, lacerations or bony tenderness. Normal range of motion. No scoliosis.        Back:       Right lower  leg: No edema.      Left lower leg: No edema.   Skin:     General: Skin is warm and dry.   Neurological:      General: No focal deficit present.      Mental Status: She is alert and oriented to person, place, and time.      Sensory: Sensation is intact. No sensory deficit.      Motor: Motor function is intact. No weakness.      Gait: Gait normal.      Deep Tendon Reflexes: Reflexes normal.      Reflex Scores:       Patellar reflexes are 2+ on the right side and 2+ on the left side.       Achilles reflexes are 2+ on the right side and 2+ on the left side.     Comments: Muscle strength 5/5 in bilateral lower extremity hip flexion, extension, knee flexion, extension as well as dorsiflexion and plantarflexion.  There is no saddle anesthesia or sensory deficits appreciated in a dermatomal distribution in the lower extremities.   Psychiatric:         Mood and Affect: Mood normal.         Behavior: Behavior normal.

## 2024-03-24 NOTE — LETTER
March 24, 2024     Patient: Christiana Contreras   YOB: 1985   Date of Visit: 3/24/2024       To Whom it May Concern:    Christiana Contreras was seen in my clinic on 3/24/2024. She may return to work on 3/26/24 .    If you have any questions or concerns, please don't hesitate to call.         Sincerely,          Guille Bermudez,         CC: No Recipients

## 2024-03-28 ENCOUNTER — PATIENT MESSAGE (OUTPATIENT)
Dept: OBGYN CLINIC | Facility: MEDICAL CENTER | Age: 39
End: 2024-03-28

## 2024-04-03 NOTE — PROGRESS NOTES
Assessment/Plan:      Diagnoses and all orders for this visit:    Persistent hyperplasia of thymus (HCC)  -     Ambulatory Referral to Endocrinology; Future    Encounter for initial prescription of contraceptive pills  -     norethindrone (Kelly) 0.35 MG tablet; Take 1 tablet (0.35 mg total) by mouth daily        - reviewed labs and pelvic US. Ovaries with increase in volume.   - reviewed options for menstrual control. Patient is most interested in POP   - RX norethindrone.  Start reviewed.  Common side effects reviewed.  Written information provided  - referral placed for endocrinology, history of hyperplasia of thymus- given recent symptoms weight gain, abnormal hair growth, acne should be evaluated   -Signs and symptoms to report reviewed    RTO 3 months for pill follow-up/ 3/2025 annual exam    Subjective:     Patient ID: Christiana Contreras is a 38 y.o. female.    HPI  here to f/u labs and pelvic US. LMP 4/3/24 . Patient was seen in office last month with  concern for decreasing length of menstrual cycles. Menses typically every 28 to 32 days and is now 20 to 25 days, increase in acne, irregular hair growth and weight gain. Labs - TSH, fT4, progesterone, estrogen, testosterone, FSH and LH all WNL. Pelvic US resulted with uterine fibroids. Medical history is significant for anxiety, depression and persistent hyperplasia of thymus.     Pelvic US 3/13/24  FINDINGS:  UTERUS:  The uterus is anteverted in position, measuring 9.7 x 5.4 x 6.3 cm.  Posterior mural leiomyoma measuring 1.2 x 1.3 x 1.3 cm is noted. Left mural leiomyoma measuring 1.3 x 1.0 x 1.2 cm is noted. Left anterior mural and subserosal 3.3 x 2.1 x 2.8 cm leiomyoma is present.  The cervix appears within normal limits.  ENDOMETRIUM:  The endometrial echo complex has an AP caliber of 10.0 mm.  Appearance within normal limits.  OVARIES/ADNEXA:  Right ovary: 3.7 x 2.1 x 1.7 cm. 6.9 mL.  Ovarian Doppler flow is within normal limits.  No suspicious  "ovarian or adnexal abnormality.  Left ovary: 3.9 x 2.2 x 2.7 cm. 12.0 mL.  Ovarian Doppler flow is within normal limits.  No suspicious ovarian or adnexal abnormality.  OTHER:  No free fluid or loculated fluid collections.  IMPRESSION:  Leiomyomatous uterus.     Last pap smear 7/8/21 NILM/ HR HPV(-)  Completed gardasil vaccine     Review of Systems   Constitutional:  Negative for chills, fatigue and fever.   Respiratory: Negative.     Cardiovascular: Negative.    Genitourinary:  Positive for menstrual problem.         Objective:  /76 (BP Location: Right arm, Patient Position: Sitting, Cuff Size: Standard)   Ht 5' 9\" (1.753 m)   Wt 77.6 kg (171 lb)   LMP 04/03/2024 (Exact Date)   BMI 25.25 kg/m²      Physical Exam  Vitals reviewed.   Constitutional:       Appearance: Normal appearance.   Cardiovascular:      Rate and Rhythm: Normal rate and regular rhythm.      Heart sounds: Normal heart sounds.   Pulmonary:      Effort: Pulmonary effort is normal.      Breath sounds: Normal breath sounds.   Neurological:      Mental Status: She is alert and oriented to person, place, and time.   Psychiatric:         Mood and Affect: Mood normal.         Behavior: Behavior normal.           "

## 2024-04-05 ENCOUNTER — OFFICE VISIT (OUTPATIENT)
Dept: OBGYN CLINIC | Facility: MEDICAL CENTER | Age: 39
End: 2024-04-05
Payer: COMMERCIAL

## 2024-04-05 VITALS
SYSTOLIC BLOOD PRESSURE: 118 MMHG | HEIGHT: 69 IN | WEIGHT: 171 LBS | DIASTOLIC BLOOD PRESSURE: 76 MMHG | BODY MASS INDEX: 25.33 KG/M2

## 2024-04-05 DIAGNOSIS — Z30.011 ENCOUNTER FOR INITIAL PRESCRIPTION OF CONTRACEPTIVE PILLS: Primary | ICD-10-CM

## 2024-04-05 DIAGNOSIS — E32.0 PERSISTENT HYPERPLASIA OF THYMUS (HCC): ICD-10-CM

## 2024-04-05 PROCEDURE — 99213 OFFICE O/P EST LOW 20 MIN: CPT | Performed by: NURSE PRACTITIONER

## 2024-04-05 RX ORDER — ACETAMINOPHEN AND CODEINE PHOSPHATE 120; 12 MG/5ML; MG/5ML
1 SOLUTION ORAL DAILY
Qty: 30 TABLET | Refills: 4 | Status: SHIPPED | OUTPATIENT
Start: 2024-04-05

## 2024-04-10 ENCOUNTER — APPOINTMENT (OUTPATIENT)
Dept: LAB | Facility: IMAGING CENTER | Age: 39
End: 2024-04-10

## 2024-04-10 ENCOUNTER — TELEPHONE (OUTPATIENT)
Dept: FAMILY MEDICINE CLINIC | Facility: CLINIC | Age: 39
End: 2024-04-10

## 2024-04-10 ENCOUNTER — OFFICE VISIT (OUTPATIENT)
Dept: FAMILY MEDICINE CLINIC | Facility: CLINIC | Age: 39
End: 2024-04-10

## 2024-04-10 VITALS
DIASTOLIC BLOOD PRESSURE: 60 MMHG | HEIGHT: 69 IN | TEMPERATURE: 97.7 F | RESPIRATION RATE: 16 BRPM | WEIGHT: 173 LBS | OXYGEN SATURATION: 94 % | SYSTOLIC BLOOD PRESSURE: 128 MMHG | HEART RATE: 75 BPM | BODY MASS INDEX: 25.62 KG/M2

## 2024-04-10 DIAGNOSIS — F17.200 TOBACCO DEPENDENCE: Primary | ICD-10-CM

## 2024-04-10 DIAGNOSIS — F32.A DEPRESSION, UNSPECIFIED DEPRESSION TYPE: ICD-10-CM

## 2024-04-10 DIAGNOSIS — L98.9 BENIGN SKIN LESION: ICD-10-CM

## 2024-04-10 DIAGNOSIS — Z00.6 ENCOUNTER FOR EXAMINATION FOR NORMAL COMPARISON OR CONTROL IN CLINICAL RESEARCH PROGRAM: ICD-10-CM

## 2024-04-10 DIAGNOSIS — E66.3 OVERWEIGHT WITH BODY MASS INDEX (BMI) OF 25 TO 25.9 IN ADULT: ICD-10-CM

## 2024-04-10 DIAGNOSIS — F41.9 ANXIETY: ICD-10-CM

## 2024-04-10 PROCEDURE — 36415 COLL VENOUS BLD VENIPUNCTURE: CPT

## 2024-04-10 RX ORDER — BUPROPION HYDROCHLORIDE 150 MG/1
150 TABLET, EXTENDED RELEASE ORAL 2 TIMES DAILY
Qty: 180 TABLET | Refills: 0 | Status: SHIPPED | OUTPATIENT
Start: 2024-04-10 | End: 2024-07-09

## 2024-04-10 NOTE — ASSESSMENT & PLAN NOTE
- Patient states she would like to lost 15 pounds. Has been gaining weight despite diet and exercise. Apprehensive about phentermine due to possibility of heart palpitations. Prescription sent for Wegovy 0.25 mg weekly x 4 weeks. Discussed side effects. Continue healthy diet and exercise and follow up in 1 month.

## 2024-04-10 NOTE — PROGRESS NOTES
Name: Christiana Contreras      : 1985      MRN: [id number]  Encounter Provider: JOSE Rebolledo  Encounter Date: 4/10/2024   Encounter department: [facility]    Assessment & Plan     1. Tobacco dependence  Assessment & Plan:  - Prescription sent for Wellbutrin. Discussed side effects.   - Will continue to monitor.     Orders:  -     buPROPion (Wellbutrin SR) 150 mg 12 hr tablet; Take 1 tablet (150 mg total) by mouth 2 (two) times a day    2. Overweight with body mass index (BMI) of 25 to 25.9 in adult  Assessment & Plan:  - Patient states she would like to lost 15 pounds. Has been gaining weight despite diet and exercise. Apprehensive about phentermine due to possibility of heart palpitations. Prescription sent for Wegovy 0.25 mg weekly x 4 weeks. Discussed side effects. Continue healthy diet and exercise and follow up in 1 month.     Orders:  -     Semaglutide-Weight Management (WEGOVY) 0.25 MG/0.5ML; Inject 0.5 mL (0.25 mg total) under the skin once a week for 4 doses    3. Depression, unspecified depression type  Assessment & Plan:  - Well controlled on Zoloft. Continue same.   - Will continue to monitor.        4. Anxiety  Assessment & Plan:  - Well controlled on Zoloft. Continue same.   - Will continue to monitor.       5. Benign skin lesion  -     Ambulatory Referral to Dermatology; Future         Subjective     Patient presents to office today with concerns regarding weight gain. States she is normally around 160 pounds. Weight today is 173 pounds. States the past few months she has been gaining weight despite eating healthier. She was worked up for PCOS which was negative. She was found to have small uterine fibroids. She is following up with her GYN.  Also reports that she would like to quit smoking. States some days she smokes more than others, depends on what she is doing. She is interested in trying Wellbutrin. Also reports having a small round lesion above her  right eyebrow that has been present since she was a child but is now becoming more irritating and she would like it removed.         Review of Systems   Constitutional:  Positive for unexpected weight change. Negative for fatigue and fever.   HENT:  Negative for trouble swallowing.    Eyes:  Negative for visual disturbance.   Respiratory:  Negative for cough and shortness of breath.    Cardiovascular:  Negative for chest pain and palpitations.   Gastrointestinal:  Negative for abdominal pain and blood in stool.   Endocrine: Negative for cold intolerance and heat intolerance.   Genitourinary:  Negative for difficulty urinating and dysuria.   Musculoskeletal:  Negative for gait problem.   Skin:  Negative for rash.   Neurological:  Negative for dizziness, syncope and headaches.   Hematological:  Negative for adenopathy.   Psychiatric/Behavioral:  Negative for behavioral problems.        Past Medical History:   Diagnosis Date   • Abnormal Pap smear of cervix    • Allergies    • Anxiety    • Depression      Past Surgical History:   Procedure Laterality Date   • GYNECOLOGIC CRYOSURGERY       Family History   Problem Relation Age of Onset   • Anemia Mother    • Testicular cancer Father    • Drug abuse Brother    • No Known Problems Son    • Lymphoma Maternal Grandmother    • Stroke Maternal Grandfather    • Breast cancer Paternal Grandmother    • No Known Problems Paternal Grandfather    • Breast cancer Paternal Aunt    • Mental illness Half-Sister    • Colon cancer Neg Hx    • Ovarian cancer Neg Hx      Social History     Socioeconomic History   • Marital status: Single     Spouse name: None   • Number of children: None   • Years of education: None   • Highest education level: None   Occupational History   • None   Tobacco Use   • Smoking status: Some Days     Current packs/day: 0.25     Types: Cigarettes   • Smokeless tobacco: Never   Vaping Use   • Vaping status: Never Used   Substance and Sexual Activity   • Alcohol  use: Yes     Comment: socially   • Drug use: No   • Sexual activity: Yes     Partners: Male     Birth control/protection: Coitus interruptus   Other Topics Concern   • None   Social History Narrative   • None     Social Determinants of Health     Financial Resource Strain: Not on file   Food Insecurity: Not on file   Transportation Needs: Not on file   Physical Activity: Not on file   Stress: Not on file   Social Connections: Not on file   Intimate Partner Violence: Not on file   Housing Stability: Not on file     Current Outpatient Medications on File Prior to Visit   Medication Sig   • cetirizine (ZyrTEC) 5 MG tablet Take 5 mg by mouth daily   • norethindrone (Kelly) 0.35 MG tablet Take 1 tablet (0.35 mg total) by mouth daily   • sertraline (ZOLOFT) 100 mg tablet Take 100 mg by mouth daily   • [DISCONTINUED] meloxicam (Mobic) 15 mg tablet Take 1 tablet (15 mg total) by mouth daily for 14 days   • [DISCONTINUED] methocarbamol (Robaxin-750) 750 mg tablet Take 1 tablet (750 mg total) by mouth 3 (three) times a day for 7 days For the first 2 days, take 1500 mg 3 times daily. Starting day 3, take 750 mg 4 times daily until finished.     Allergies   Allergen Reactions   • Penicillins      Immunization History   Administered Date(s) Administered   • COVID-19 PFIZER VACCINE 0.3 ML IM 12/30/2020, 02/10/2021   • DTP 01/21/1988, 03/17/1988, 05/11/1988, 01/24/1990, 05/13/1993   • HPV Quadrivalent 03/12/2007, 05/15/2007, 09/12/2007   • Hep B, Adolescent or Pediatric 02/02/1998, 03/02/1998, 08/12/1998   • INFLUENZA 11/04/2013, 11/13/2018, 10/23/2020, 10/04/2021, 11/28/2022   • IPV 01/21/1988, 03/17/1988, 01/24/1990, 05/13/1993   • Influenza Injectable, MDCK, Preservative Free, Quadrivalent, 0.5 mL 09/17/2019, 11/28/2022   • MMR 10/17/1994, 05/12/1998, 05/29/2018   • Td (adult), Unspecified 07/08/2003, 06/09/2011   • Tuberculin Skin Test-PPD Intradermal 07/23/2010, 04/26/2016, 04/28/2018, 05/07/2018       Objective     BP  "128/60 (BP Location: Left arm, Patient Position: Sitting, Cuff Size: Adult)   Pulse 75   Temp 97.7 °F (36.5 °C) (Tympanic)   Resp 16   Ht 5' 9\" (1.753 m)   Wt 78.5 kg (173 lb)   LMP 04/03/2024 (Exact Date)   SpO2 94%   BMI 25.55 kg/m²     Physical Exam  Vitals and nursing note reviewed.   Constitutional:       Appearance: Normal appearance.   HENT:      Head: Normocephalic and atraumatic.      Right Ear: External ear normal.      Left Ear: External ear normal.   Eyes:      Conjunctiva/sclera: Conjunctivae normal.   Cardiovascular:      Rate and Rhythm: Normal rate and regular rhythm.      Heart sounds: Normal heart sounds.   Pulmonary:      Effort: Pulmonary effort is normal.      Breath sounds: Normal breath sounds.   Musculoskeletal:         General: Normal range of motion.      Cervical back: Normal range of motion.   Skin:     General: Skin is warm and dry.   Neurological:      Mental Status: She is alert and oriented to person, place, and time.   Psychiatric:         Mood and Affect: Mood normal.         Behavior: Behavior normal.       JOSE Rebolledo    "

## 2024-04-10 NOTE — TELEPHONE ENCOUNTER
----- Message from Nazia Bailey sent at 4/9/2024  5:15 PM EDT -----  Regarding: FW: Wellbutrin   Contact: [phone]    ----- Message -----  From: Christiana Contreras  Sent: 4/9/2024   5:12 PM EDT  To: [facility]  Subject: Wellbutrin                                       Hello! I have been looking into starting birth control and trying to completely quit smoking altogether and saw that Wellbutrin is a possible option in a low dose to assist with both of those things, would this be something you would be open to me trying?

## 2024-04-11 ENCOUNTER — TELEPHONE (OUTPATIENT)
Age: 39
End: 2024-04-11

## 2024-04-11 NOTE — TELEPHONE ENCOUNTER
PA for (WEGOVY) 0.25 MG/0.5ML     Submitted via    []CMM-KEY   [x][employer]-Case ID # [id number]   []Faxed to plan   []Other website   []Phone call Case ID #     Office notes sent, clinical questions answered. Awaiting determination    Turnaround time for your insurance to make a decision on your Prior Authorization can take 7-21 business days.

## 2024-04-17 ENCOUNTER — PATIENT MESSAGE (OUTPATIENT)
Dept: FAMILY MEDICINE CLINIC | Facility: CLINIC | Age: 39
End: 2024-04-17

## 2024-04-23 ENCOUNTER — TELEPHONE (OUTPATIENT)
Dept: FAMILY MEDICINE CLINIC | Facility: CLINIC | Age: 39
End: 2024-04-23

## 2024-04-24 ENCOUNTER — TELEPHONE (OUTPATIENT)
Dept: FAMILY MEDICINE CLINIC | Facility: CLINIC | Age: 39
End: 2024-04-24

## 2024-04-28 DIAGNOSIS — Z30.011 ENCOUNTER FOR INITIAL PRESCRIPTION OF CONTRACEPTIVE PILLS: ICD-10-CM

## 2024-04-28 LAB
APOB+LDLR+PCSK9 GENE MUT ANL BLD/T: NOT DETECTED
BRCA1+BRCA2 DEL+DUP + FULL MUT ANL BLD/T: NOT DETECTED
MLH1+MSH2+MSH6+PMS2 GN DEL+DUP+FUL M: NOT DETECTED

## 2024-04-29 RX ORDER — ACETAMINOPHEN AND CODEINE PHOSPHATE 120; 12 MG/5ML; MG/5ML
1 SOLUTION ORAL DAILY
Qty: 84 TABLET | Refills: 1 | Status: SHIPPED | OUTPATIENT
Start: 2024-04-29

## 2024-05-01 ENCOUNTER — TELEPHONE (OUTPATIENT)
Dept: FAMILY MEDICINE CLINIC | Facility: CLINIC | Age: 39
End: 2024-05-01

## 2024-05-03 ENCOUNTER — TELEPHONE (OUTPATIENT)
Dept: FAMILY MEDICINE CLINIC | Facility: CLINIC | Age: 39
End: 2024-05-03

## 2024-05-07 ENCOUNTER — TELEPHONE (OUTPATIENT)
Age: 39
End: 2024-05-07

## 2024-05-07 DIAGNOSIS — F41.9 ANXIETY: Primary | ICD-10-CM

## 2024-05-07 DIAGNOSIS — F41.9 ANXIETY: ICD-10-CM

## 2024-05-07 RX ORDER — SERTRALINE HYDROCHLORIDE 100 MG/1
100 TABLET, FILM COATED ORAL DAILY
Qty: 90 TABLET | Refills: 1 | Status: CANCELLED | OUTPATIENT
Start: 2024-05-07

## 2024-05-07 RX ORDER — SERTRALINE HYDROCHLORIDE 100 MG/1
100 TABLET, FILM COATED ORAL DAILY
Qty: 90 TABLET | Refills: 1 | Status: SHIPPED | OUTPATIENT
Start: 2024-05-07 | End: 2024-05-07 | Stop reason: SDUPTHER

## 2024-05-07 RX ORDER — SERTRALINE HYDROCHLORIDE 100 MG/1
100 TABLET, FILM COATED ORAL DAILY
Qty: 90 TABLET | Refills: 1 | Status: SHIPPED | OUTPATIENT
Start: 2024-05-07

## 2024-05-07 NOTE — TELEPHONE ENCOUNTER
Patient's Wegovy PA was denied because her BMI was 25.31. Patient reported to them that that is incorrect. Patient's most recent BMI is 25.55. They will reach out to the office if more information is needed.

## 2024-05-07 NOTE — TELEPHONE ENCOUNTER
Patient calling in states insurance denied her Wegovy PA because her BMI was too low, states she just weighed herself at work now and it has gone up to 178.6. Asked patient what the minimum BMI insurance was looking for she stated around 27. Patient didn't know if she needed to come into the office to be weighted, and then resubmit the appeal with the higher BMI. Please advise.

## 2024-05-07 NOTE — TELEPHONE ENCOUNTER
I calculated her BMI with the new weight she reported and it is 26.37 which is still not acceptable BMI for her insurance to cover Wegovy. I messaged pt through her my chart.

## 2024-05-07 NOTE — TELEPHONE ENCOUNTER
She messaged back and said her bmi is over 27 and she is almost 180lbs. I advised pt to schedule a visit so we can document her weight.

## 2024-05-10 ENCOUNTER — OFFICE VISIT (OUTPATIENT)
Dept: FAMILY MEDICINE CLINIC | Facility: CLINIC | Age: 39
End: 2024-05-10
Payer: COMMERCIAL

## 2024-05-10 VITALS
TEMPERATURE: 97.9 F | SYSTOLIC BLOOD PRESSURE: 128 MMHG | WEIGHT: 179 LBS | RESPIRATION RATE: 16 BRPM | HEART RATE: 92 BPM | DIASTOLIC BLOOD PRESSURE: 70 MMHG | OXYGEN SATURATION: 94 % | BODY MASS INDEX: 27.13 KG/M2 | HEIGHT: 68 IN

## 2024-05-10 DIAGNOSIS — E66.3 OVERWEIGHT WITH BODY MASS INDEX (BMI) OF 27 TO 27.9 IN ADULT: Primary | ICD-10-CM

## 2024-05-10 PROCEDURE — 99213 OFFICE O/P EST LOW 20 MIN: CPT | Performed by: NURSE PRACTITIONER

## 2024-05-10 NOTE — PROGRESS NOTES
Name: Christiana Contreras      : 1985      MRN: [id number]  Encounter Provider: JOSE Rebolledo  Encounter Date: 5/10/2024   Encounter department: [facility]    Assessment & Plan     1. Overweight with body mass index (BMI) of 27 to 27.9 in adult  Assessment & Plan:  - Patient states she would like to lose 15 pounds. Has been gaining weight despite diet and exercise. Apprehensive about phentermine due to possibility of heart palpitations. Prescription sent for Wegovy 0.25 mg weekly x 4 weeks. Discussed side effects. Continue healthy diet and exercise and follow up in 1 month.      Orders:  -     Semaglutide-Weight Management (WEGOVY) 0.25 MG/0.5ML; Inject 0.5 mL (0.25 mg total) under the skin once a week for 4 doses           Subjective     Patient presents to office today with concerns regarding weight gain. States she is normally around 160 pounds. Weight today is 179 pounds. Weight last month was 173 lb States the past few months she has been gaining weight despite eating healthier. She was worked up for PCOS which was negative. She was found to have small uterine fibroids. She is following up with her GYN.  Discussed phentermine with patient but she is apprehensive about heart palpitations. Would like to try Wegovy for weight loss. She denies any other concerns or complaints today.       Review of Systems   Constitutional:  Positive for unexpected weight change (weight gain). Negative for fatigue and fever.   HENT:  Negative for trouble swallowing.    Eyes:  Negative for visual disturbance.   Respiratory:  Negative for cough and shortness of breath.    Cardiovascular:  Negative for chest pain and palpitations.   Gastrointestinal:  Negative for abdominal pain and blood in stool.   Endocrine: Negative for cold intolerance and heat intolerance.   Genitourinary:  Negative for difficulty urinating and dysuria.   Musculoskeletal:  Negative for gait problem.   Skin:  Negative  for rash.   Neurological:  Negative for dizziness, syncope and headaches.   Hematological:  Negative for adenopathy.   Psychiatric/Behavioral:  Negative for behavioral problems.        Past Medical History:   Diagnosis Date   • Abnormal Pap smear of cervix    • Allergies    • Anxiety    • Depression      Past Surgical History:   Procedure Laterality Date   • GYNECOLOGIC CRYOSURGERY       Family History   Problem Relation Age of Onset   • Anemia Mother    • Testicular cancer Father    • Drug abuse Brother    • No Known Problems Son    • Lymphoma Maternal Grandmother    • Stroke Maternal Grandfather    • Breast cancer Paternal Grandmother    • No Known Problems Paternal Grandfather    • Breast cancer Paternal Aunt    • Mental illness Half-Sister    • Colon cancer Neg Hx    • Ovarian cancer Neg Hx      Social History     Socioeconomic History   • Marital status: Single     Spouse name: None   • Number of children: None   • Years of education: None   • Highest education level: None   Occupational History   • None   Tobacco Use   • Smoking status: Some Days     Current packs/day: 0.25     Types: Cigarettes   • Smokeless tobacco: Never   Vaping Use   • Vaping status: Never Used   Substance and Sexual Activity   • Alcohol use: Yes     Comment: socially   • Drug use: No   • Sexual activity: Yes     Partners: Male     Birth control/protection: Coitus interruptus   Other Topics Concern   • None   Social History Narrative   • None     Social Determinants of Health     Financial Resource Strain: Not on file   Food Insecurity: Not on file   Transportation Needs: Not on file   Physical Activity: Not on file   Stress: Not on file   Social Connections: Not on file   Intimate Partner Violence: Not on file   Housing Stability: Not on file     Current Outpatient Medications on File Prior to Visit   Medication Sig   • buPROPion (Wellbutrin SR) 150 mg 12 hr tablet Take 1 tablet (150 mg total) by mouth 2 (two) times a day   • cetirizine  "(ZyrTEC) 5 MG tablet Take 5 mg by mouth daily   • norethindrone (MICRONOR) 0.35 MG tablet TAKE 1 TABLET BY MOUTH EVERY DAY   • sertraline (ZOLOFT) 100 mg tablet Take 1 tablet (100 mg total) by mouth daily     Allergies   Allergen Reactions   • Penicillins      Immunization History   Administered Date(s) Administered   • COVID-19 PFIZER VACCINE 0.3 ML IM 12/30/2020, 02/10/2021   • DTP 01/21/1988, 03/17/1988, 05/11/1988, 01/24/1990, 05/13/1993   • HPV Quadrivalent 03/12/2007, 05/15/2007, 09/12/2007   • Hep B, Adolescent or Pediatric 02/02/1998, 03/02/1998, 08/12/1998   • INFLUENZA 11/04/2013, 11/13/2018, 10/23/2020, 10/04/2021, 11/28/2022   • IPV 01/21/1988, 03/17/1988, 01/24/1990, 05/13/1993   • Influenza Injectable, MDCK, Preservative Free, Quadrivalent, 0.5 mL 09/17/2019, 11/28/2022   • MMR 10/17/1994, 05/12/1998, 05/29/2018   • Td (adult), Unspecified 07/08/2003, 06/09/2011   • Tuberculin Skin Test-PPD Intradermal 07/23/2010, 04/26/2016, 04/28/2018, 05/07/2018       Objective     /70 (BP Location: Left arm, Patient Position: Sitting, Cuff Size: Large)   Pulse 92   Temp 97.9 °F (36.6 °C) (Tympanic)   Resp 16   Ht 5' 8\" (1.727 m)   Wt 81.2 kg (179 lb)   SpO2 94%   BMI 27.22 kg/m²     Physical Exam  Vitals and nursing note reviewed.   Constitutional:       Appearance: Normal appearance.   HENT:      Head: Normocephalic and atraumatic.      Right Ear: External ear normal.      Left Ear: External ear normal.   Eyes:      Conjunctiva/sclera: Conjunctivae normal.   Cardiovascular:      Rate and Rhythm: Normal rate and regular rhythm.      Heart sounds: Normal heart sounds.   Pulmonary:      Effort: Pulmonary effort is normal.      Breath sounds: Normal breath sounds.   Musculoskeletal:         General: Normal range of motion.      Cervical back: Normal range of motion.   Skin:     General: Skin is warm and dry.   Neurological:      Mental Status: She is alert and oriented to person, place, and time. "   Psychiatric:         Mood and Affect: Mood normal.         Behavior: Behavior normal.       JOSE Rebolledo

## 2024-05-28 ENCOUNTER — TELEPHONE (OUTPATIENT)
Dept: FAMILY MEDICINE CLINIC | Facility: CLINIC | Age: 39
End: 2024-05-28

## 2024-05-28 NOTE — TELEPHONE ENCOUNTER
Pt called to see what was going on with her prior auth for Wegovy.Patient  did a 3 way call with insurance and I started another appeal for her Wegovy CASE # [id number]. I faxed over all clinical notes to the Appeal dept at [phone] and pt is aware. Appeal can take up to 15 days for approval.

## 2024-06-12 ENCOUNTER — OFFICE VISIT (OUTPATIENT)
Dept: FAMILY MEDICINE CLINIC | Facility: CLINIC | Age: 39
End: 2024-06-12

## 2024-06-12 ENCOUNTER — OFFICE VISIT (OUTPATIENT)
Dept: ENDOCRINOLOGY | Facility: CLINIC | Age: 39
End: 2024-06-12

## 2024-06-12 VITALS
BODY MASS INDEX: 25.46 KG/M2 | HEIGHT: 68 IN | DIASTOLIC BLOOD PRESSURE: 72 MMHG | SYSTOLIC BLOOD PRESSURE: 126 MMHG | WEIGHT: 168 LBS

## 2024-06-12 VITALS
BODY MASS INDEX: 25.88 KG/M2 | HEART RATE: 78 BPM | TEMPERATURE: 98.4 F | SYSTOLIC BLOOD PRESSURE: 112 MMHG | OXYGEN SATURATION: 98 % | DIASTOLIC BLOOD PRESSURE: 72 MMHG | HEIGHT: 68 IN | WEIGHT: 170.8 LBS | RESPIRATION RATE: 16 BRPM

## 2024-06-12 DIAGNOSIS — E32.0 PERSISTENT HYPERPLASIA OF THYMUS (HCC): ICD-10-CM

## 2024-06-12 DIAGNOSIS — E66.3 OVERWEIGHT WITH BODY MASS INDEX (BMI) OF 25 TO 25.9 IN ADULT: Primary | ICD-10-CM

## 2024-06-12 DIAGNOSIS — L68.0 HIRSUTISM: Primary | ICD-10-CM

## 2024-06-12 PROCEDURE — 99244 OFF/OP CNSLTJ NEW/EST MOD 40: CPT | Performed by: INTERNAL MEDICINE

## 2024-06-12 PROCEDURE — 99213 OFFICE O/P EST LOW 20 MIN: CPT | Performed by: NURSE PRACTITIONER

## 2024-06-12 RX ORDER — SEMAGLUTIDE 0.25 MG/.5ML
0.25 INJECTION, SOLUTION SUBCUTANEOUS WEEKLY
COMMUNITY

## 2024-06-12 NOTE — PATIENT INSTRUCTIONS
- Please have the lab work completed on day 3 of your menstrual cycle at 8 am  Salivary cortisol - Sample Collection  - Do not brush your teeth before collecting the sample.  - Do not eat or drink 15 minutes before collecting the sample.  - Do not touch the swab with your fingers.  - Collect the sample between 11 p.m. and midnight.       - I will call you to discuss lab results and next steps

## 2024-06-12 NOTE — PROGRESS NOTES
Ambulatory Visit  Name: Christiana Contreras      : 1985      MRN: [id number]  Encounter Provider: JOSE Rebolledo  Encounter Date: 2024   Encounter department: [facility]    Assessment & Plan   1. Overweight with body mass index (BMI) of 25 to 25.9 in adult  Assessment & Plan:  - She is down 11 pounds since start Ozmepic. Would like to lose about 8 more pounds. Continue Ozempic 0.5 mg weekly for next 4 weeks.   - Continue healthy diet and regular exercise.  - Will continue to monitor.          History of Present Illness   {Disappearing Hyperlinks I Encounters * My Last Note * Since Last Visit * History :96968}  Patient presents to office today for 1 month follow up. She was given a sample of Ozempic. She is down 11 pounds since last visit. Denies any side effects. She would like to lose an additional 8 pounds. She denies any other concerns or complaints today.              Review of Systems   Constitutional:  Negative for fatigue and fever.   HENT:  Negative for trouble swallowing.    Eyes:  Negative for visual disturbance.   Respiratory:  Negative for cough and shortness of breath.    Cardiovascular:  Negative for chest pain and palpitations.   Gastrointestinal:  Negative for abdominal pain and blood in stool.   Endocrine: Negative for cold intolerance and heat intolerance.   Genitourinary:  Negative for difficulty urinating and dysuria.   Musculoskeletal:  Negative for gait problem.   Skin:  Negative for rash.   Neurological:  Negative for dizziness, syncope and headaches.   Hematological:  Negative for adenopathy.   Psychiatric/Behavioral:  Negative for behavioral problems.      Past Medical History:   Diagnosis Date   • Abnormal Pap smear of cervix    • Allergies    • Anxiety    • Depression      Past Surgical History:   Procedure Laterality Date   • GYNECOLOGIC CRYOSURGERY       Family History   Problem Relation Age of Onset   • Anemia Mother    • Testicular cancer  Father    • Drug abuse Brother    • No Known Problems Son    • Lymphoma Maternal Grandmother    • Stroke Maternal Grandfather    • Breast cancer Paternal Grandmother    • No Known Problems Paternal Grandfather    • Breast cancer Paternal Aunt    • Mental illness Half-Sister    • Colon cancer Neg Hx    • Ovarian cancer Neg Hx      Social History     Tobacco Use   • Smoking status: Some Days     Current packs/day: 0.25     Types: Cigarettes   • Smokeless tobacco: Never   Vaping Use   • Vaping status: Never Used   Substance and Sexual Activity   • Alcohol use: Yes     Comment: socially   • Drug use: No   • Sexual activity: Yes     Partners: Male     Birth control/protection: Coitus interruptus     Current Outpatient Medications on File Prior to Visit   Medication Sig   • cetirizine (ZyrTEC) 5 MG tablet Take 5 mg by mouth daily   • norethindrone (MICRONOR) 0.35 MG tablet TAKE 1 TABLET BY MOUTH EVERY DAY   • Semaglutide-Weight Management (Wegovy) 0.25 MG/0.5ML Inject 0.25 mg under the skin once a week   • sertraline (ZOLOFT) 100 mg tablet Take 1 tablet (100 mg total) by mouth daily   • buPROPion (Wellbutrin SR) 150 mg 12 hr tablet Take 1 tablet (150 mg total) by mouth 2 (two) times a day (Patient not taking: Reported on 6/12/2024)     Allergies   Allergen Reactions   • Penicillins      Immunization History   Administered Date(s) Administered   • COVID-19 PFIZER VACCINE 0.3 ML IM 12/30/2020, 02/10/2021   • DTP 01/21/1988, 03/17/1988, 05/11/1988, 01/24/1990, 05/13/1993   • HPV Quadrivalent 03/12/2007, 05/15/2007, 09/12/2007   • Hep B, Adolescent or Pediatric 02/02/1998, 03/02/1998, 08/12/1998   • INFLUENZA 11/04/2013, 11/13/2018, 10/23/2020, 10/04/2021, 11/28/2022   • IPV 01/21/1988, 03/17/1988, 01/24/1990, 05/13/1993   • Influenza Injectable, MDCK, Preservative Free, Quadrivalent, 0.5 mL 09/17/2019, 11/28/2022   • MMR 10/17/1994, 05/12/1998, 05/29/2018   • Td (adult), Unspecified 07/08/2003, 06/09/2011   • Tuberculin Skin  "Test-PPD Intradermal 07/23/2010, 04/26/2016, 04/28/2018, 05/07/2018     Objective   {Disappearing Hyperlinks   Review Vitals * Enter New Vitals * Results Review * Labs * Imaging * Cardiology * Procedures * Lung Cancer Screening :39921}  /72 (BP Location: Left arm, Patient Position: Sitting, Cuff Size: Standard)   Pulse 78   Temp 98.4 °F (36.9 °C) (Tympanic)   Resp 16   Ht 5' 8\" (1.727 m)   Wt 77.5 kg (170 lb 12.8 oz)   SpO2 98%   BMI 25.97 kg/m²     Physical Exam  Vitals and nursing note reviewed.   Constitutional:       Appearance: Normal appearance. She is well-developed.   HENT:      Head: Normocephalic and atraumatic.      Right Ear: External ear normal.      Left Ear: External ear normal.   Eyes:      Conjunctiva/sclera: Conjunctivae normal.   Cardiovascular:      Rate and Rhythm: Normal rate and regular rhythm.      Heart sounds: Normal heart sounds.   Pulmonary:      Effort: Pulmonary effort is normal.      Breath sounds: Normal breath sounds.   Musculoskeletal:         General: Normal range of motion.      Cervical back: Normal range of motion.   Skin:     General: Skin is warm and dry.   Neurological:      Mental Status: She is alert and oriented to person, place, and time.   Psychiatric:         Mood and Affect: Mood normal.         Behavior: Behavior normal.       Administrative Statements {Disappearing Hyperlinks I  Level of Service * [facility]/PCSP:[id number]}        "

## 2024-06-12 NOTE — ASSESSMENT & PLAN NOTE
- She is down 11 pounds since start Ozmepic. Would like to lose about 8 more pounds. Continue Ozempic 0.5 mg weekly for next 4 weeks.   - Continue healthy diet and regular exercise.  - Will continue to monitor.

## 2024-08-19 DIAGNOSIS — F41.9 ANXIETY: ICD-10-CM

## 2024-08-19 RX ORDER — SERTRALINE HYDROCHLORIDE 100 MG/1
100 TABLET, FILM COATED ORAL DAILY
Qty: 90 TABLET | Refills: 0 | Status: SHIPPED | OUTPATIENT
Start: 2024-08-19

## 2024-08-30 LAB — DHEA-S SERPL-MCNC: 280 UG/DL (ref 23–266)

## 2024-09-07 LAB — 17OHP SERPL-MCNC: 13.9 NG/DL
